# Patient Record
Sex: FEMALE | NOT HISPANIC OR LATINO | Employment: STUDENT | URBAN - METROPOLITAN AREA
[De-identification: names, ages, dates, MRNs, and addresses within clinical notes are randomized per-mention and may not be internally consistent; named-entity substitution may affect disease eponyms.]

---

## 2024-06-25 ENCOUNTER — PRENATAL OFFICE VISIT (OUTPATIENT)
Dept: FAMILY MEDICINE | Facility: CLINIC | Age: 24
End: 2024-06-25
Payer: COMMERCIAL

## 2024-06-25 VITALS
DIASTOLIC BLOOD PRESSURE: 70 MMHG | OXYGEN SATURATION: 98 % | WEIGHT: 114 LBS | TEMPERATURE: 97.6 F | BODY MASS INDEX: 22.98 KG/M2 | HEIGHT: 59 IN | HEART RATE: 83 BPM | SYSTOLIC BLOOD PRESSURE: 90 MMHG

## 2024-06-25 DIAGNOSIS — Z34.81 ENCOUNTER FOR SUPERVISION OF OTHER NORMAL PREGNANCY IN FIRST TRIMESTER: Primary | ICD-10-CM

## 2024-06-25 PROCEDURE — 99213 OFFICE O/P EST LOW 20 MIN: CPT | Performed by: FAMILY MEDICINE

## 2024-06-25 RX ORDER — CHOLECALCIFEROL (VITAMIN D3) 50 MCG
1 TABLET ORAL DAILY
Qty: 90 TABLET | Refills: 1 | Status: SHIPPED | OUTPATIENT
Start: 2024-06-25

## 2024-06-25 NOTE — PATIENT INSTRUCTIONS
Weeks 10 to 14 of Your Pregnancy: Care Instructions  It's now possible to hear the fetus's heartbeat with a special ultrasound device. And the fetus's organs are developing.    Decide about tests to check for birth defects. Think about your age, your chance of passing on a family disease, your need to know about any problems, and what you might do after you have the test results.    It's okay to exercise. Try activities such as walking or swimming. Check with your doctor before starting a new program.    You may feel more tired than usual.  Taking naps during the day may help.     You may feel emotional.  It might help to talk to someone.     You may have headaches.  Try lying down and putting a cool cloth over your forehead.     You can use acetaminophen (Tylenol) for pain relief.  Don't take any anti-inflammatory medicines (such as Advil, Motrin, Aleve), unless your doctor says it's okay.     You may feel a fullness or aching in your lower belly.  This can feel like the kind of cramps you might get before a period. A back rub may help.     You may need to urinate more.  Your growing uterus and changing hormones can affect your bladder.     You may feel sick to your stomach (morning sickness).  Try avoiding food and smells that make you feel sick.     Your breasts may feel different.  They may feel tender or get bigger. Your nipples may get darker. Try a bra that gives you good support.     Avoid alcohol, tobacco, and drugs (including marijuana).  If you need help quitting, talk to your doctor.     Take a daily prenatal vitamin.  Choose one with folic acid.   Follow-up care is a key part of your treatment and safety. Be sure to make and go to all appointments, and call your doctor if you are having problems. It's also a good idea to know your test results and keep a list of the medicines you take.  Where can you learn more?  Go to https://www.healthwise.net/patiented  Enter E090 in the search box to learn more  "about \"Weeks 10 to 14 of Your Pregnancy: Care Instructions.\"  Current as of: July 10, 2023               Content Version: 14.0    2297-6755 [employer].   Care instructions adapted under license by your healthcare professional. If you have questions about a medical condition or this instruction, always ask your healthcare professional. [employer] disclaims any warranty or liability for your use of this information.      Nutrition During Pregnancy: Care Instructions  Overview     Healthy eating when you are pregnant is important for you and your baby. It can help you feel well and have a successful pregnancy and delivery. During pregnancy your nutrition needs increase. Even if you have excellent eating habits, your doctor may recommend a multivitamin to make sure you get enough iron and folic acid.  You may wonder how much weight you should gain. In general, if you were at a healthy weight before you became pregnant, then you should gain between 25 and 35 pounds. If you were overweight before pregnancy, then you'll likely be advised to gain 15 to 25 pounds. If you were underweight before pregnancy, then you'll probably be advised to gain 28 to 40 pounds. Your doctor will work with you to set a weight goal that is right for you. Gaining a healthy amount of weight helps you have a healthy baby.  Follow-up care is a key part of your treatment and safety. Be sure to make and go to all appointments, and call your doctor if you are having problems. It's also a good idea to know your test results and keep a list of the medicines you take.  How can you care for yourself at home?  Eat plenty of fruits and vegetables. Include a variety of orange, yellow, and leafy dark-green vegetables every day.  Choose whole-grain bread, cereal, and pasta. Good choices include whole wheat bread, whole wheat pasta, brown rice, and oatmeal.  Get 4 or more servings of milk and milk products each day. Good choices " include nonfat or low-fat milk, yogurt, and cheese. If you cannot eat milk products, you can get calcium from calcium-fortified products such as orange juice, soy milk, and tofu. Other non-milk sources of calcium include leafy green vegetables, such as broccoli, kale, mustard greens, turnip greens, bok ramu, and brussels sprouts.  If you eat meat, pick lower-fat types. Good choices include lean cuts of meat and chicken or turkey without the skin.  Avoid fish that are high in mercury. These include shark, swordfish, shi mackerel, marlin, orange roughy, and bigeye tuna, as well as tilefish from the [facility].  It's okay to eat up to 8 to 12 ounces a week of fish that are low in mercury or up to 4 ounces a week of fish that have medium levels of mercury. Some fish that are low in mercury are salmon, shrimp, canned light tuna, cod, and tilapia. Some fish that have medium levels of mercury are halibut and white albacore tuna.  For more advice about eating fish, you can visit the U.S. Food and Drug Administration (FDA) or U.S. Environmental Protection Agency (EPA) website.  Heat lunch meats (such as turkey, ham, or bologna) to 165 F before you eat them. This reduces your risk of getting sick from a kind of bacteria that can be found in lunch meats.  Do not eat unpasteurized soft cheeses, such as brie, feta, fresh mozzarella, and blue cheese. They have a bacteria that could harm your baby.  Limit caffeine to about 200 to 300 mg per day. On average, a cup of brewed coffee has around 80 to 100 mg of caffeine.  Do not drink any alcohol. No amount of alcohol has been found to be safe during pregnancy.  Do not diet or try to lose weight. For example, do not follow a low-carbohydrate diet. If you are overweight at the start of your pregnancy, your doctor will work with you to manage your weight gain.  Tell your doctor about all vitamins and supplements you take.  When should you call for help?  Watch closely for changes in  "your health, and be sure to contact your doctor if you have any problems.  Where can you learn more?  Go to https://www."[employer]".net/patiented  Enter Y785 in the search box to learn more about \"Nutrition During Pregnancy: Care Instructions.\"  Current as of: September 20, 2023               Content Version: 14.0    0498-4412 [employer].   Care instructions adapted under license by your healthcare professional. If you have questions about a medical condition or this instruction, always ask your healthcare professional. [employer], [employer] disclaims any warranty or liability for your use of this information.      "

## 2024-06-25 NOTE — PROGRESS NOTES
SUBJECTIVE:      23 year old, female, , 13w2d,  who presents to the clinic today for a new ob visit.    Feels well. Has  started PNV.  Estimated Date of Delivery: Dec 29, 2024   Dec 29, 2024 is calculated from Patient's last menstrual period was 2024..    Cycles are usually 45-60 days   Pregnancy test positive early may   She has not had bleeding since her LMP.   She has had mild nausea. Weight loss has not occurred.   This was a planned pregnancy.   FOB is involved,   Julee       OTHER CONCERNS: none , had healthy normal delivery sep 2022 at [facility] able to review the records     ===========================================  ROS      PSYCHIATRIC:  Denies mood changes, difficulty concentrating, depression, or anxiety         No data to display                 Social Connections: Unknown (2024)    [facility]    Social Connections     Frequency of Communication with Friends and Family: Not on file        History   Drug Use Not on file       History   Smoking Status    Never   Smokeless Tobacco    Never       Social History    Substance and Sexual Activity      Alcohol use: Not on file      History reviewed. No pertinent family history.      MEDICAL HISTORY     No Known Allergies      Current Outpatient Medications:     Omega-3 Fatty Acids (FISH OIL PO), , Disp: , Rfl:     Prenatal Vit-Fe Fumarate-FA (PRENATAL PO), , Disp: , Rfl:     vitamin D3 (CHOLECALCIFEROL) 50 mcg (2000 units) tablet, Take 1 tablet (50 mcg) by mouth daily, Disp: 90 tablet, Rfl: 1    History reviewed. No pertinent past medical history.    History reviewed. No pertinent surgical history.    OB History    Para Term  AB Living   2 1 1 0 0 1   SAB IAB Ectopic Multiple Live Births   0 0 0 0 1      # Outcome Date GA Lbr Karthikeyan/2nd Weight Sex Type Anes PTL Lv   2 Current            1 Term 20 41w0d 15:57 / 00:33 3.118 kg (6 lb 14 oz) F Vag-Spont   "N YAEL      Birth Comments: epidrual, spont labor, breast feeding 8 months, long bith 17 hours Region      Name: Stewart      Apgar1: 9  Apgar5: 9       GYN History-  Abnormal Pap Smears: never done per pt                        Cervical procedures: none                         History of STI: none     I personally reviewed the past social/family/medical and surgical history on the date of service.   I reviewed lab work done at Intake visit with patient.    OBJECTIVE:   PHYSICAL EXAM:  BP 90/70 (BP Location: Left arm, Patient Position: Sitting, Cuff Size: Adult Regular)   Pulse 83   Temp 97.6  F (36.4  C) (Temporal)   Ht 1.499 m (4' 11\")   Wt 51.7 kg (114 lb)   LMP 03/24/2024   SpO2 98%   BMI 23.03 kg/m    BMI- Body mass index is 23.03 kg/m ., GENERAL:  Pleasant pregnant female, alert, cooperative  and well groomed.    PHYSICAL EXAM  General: Alert, no acute distress.   EYES normocephalic conjunctivae are jerrell,   EAR Normal pearly TMs bilaterally without erythema, pus or fluid  Nose is clear.  Oropharynx is moist and clear,   Neck: supple without adenopathy or thyromegaly.  Lungs: Good aeration bilaterally.Clear to auscultation without wheezes, rales or rhonci.    Heart: regular rate and rhythm, normal S1 and S2, no murmurs  Pelvic exam: declined today .   Abdomen: soft and nontender, bowel sounds are present, no hepatosplenomegaly or mass palpable.  Skin: clear without rash or lesions  Neuro: normal muscle tone in all 4 extremities, deep tendon reflexes 2+ symmetrically at the patella     Intrauterine pregnancy 13w2d size  consistent with dates but with longer cycles will order US to confirm dates   POC ultrasound showed normal IUP    Genetic Screening: Declines early screening    Tong was seen today for prenatal care.    Diagnoses and all orders for this visit:    Encounter for supervision of other normal pregnancy in first trimester  -     ABO/Rh type and screen; Future  -     Hepatitis B surface antigen; " Future  -     CBC with platelets; Future  -     HIV Antigen Antibody Combo; Future  -     Rubella Antibody IgG; Future  -     Treponema Abs w Reflex to RPR and Titer; Future  -     Urine Culture Aerobic Bacterial; Future  -     Hemoglobin A1c; Future  -     Hepatitis C antibody; Future  -     TSH with free T4 reflex; Future  -     Myriad Non-Invasive Prenatal Screening-Prequel; Future  -     US OB <14 Weeks w Transvaginal Single; Future  -     vitamin D3 (CHOLECALCIFEROL) 50 mcg (2000 units) tablet; Take 1 tablet (50 mcg) by mouth daily  -     Alpha fetoprotein maternal screen; Future    Other orders  -     PRIMARY CARE FOLLOW-UP SCHEDULING; Future         PLAN:    Orders Placed This Encounter   Procedures    US OB <14 Weeks w Transvaginal Single    Hepatitis B surface antigen    CBC with platelets    HIV Antigen Antibody Combo    Rubella Antibody IgG    Treponema Abs w Reflex to RPR and Titer    Hemoglobin A1c    Hepatitis C antibody    TSH with free T4 reflex    Myriad Non-Invasive Prenatal Screening-Prequel    Alpha fetoprotein maternal screen    Urine Culture Aerobic Bacterial    ABO/Rh type and screen         Discussed:  - Pre-pregnancy  Body mass index is 23.03 kg/m .   Reviewed best evidence for: weight gain for her weight and height for pregnancy:  RECOMMENDED WEIGHT GAIN: 15-25 lbs.   healthy diet and foods to avoid; exercise and activity during pregnancy;avoiding exposure to toxoplasmosis; and maintenance of a generally healthy lifestyle.   - Influenza vaccine  UTD  - COVID vaccinated will do booster in fall   - Genetic screening options, including false positive rate with screening tests and diagnostic options (chorionic villus sampling, amniocentesis),     - Safe medications during pregnancy and prenatal vitamin daily discussed.   - Healthy habits including not using tobacco or alcohol, exercising regularly and maintaining healthy diet  - Information given on tips for dealing with nausea, healthy habits,  exposures, safety, prenatal appointment schedule, and when to call the doctor.  - Recommendations for breastfeeding given.    - Preeclampsia risk factors:  High risk factors (1+): none  Moderate risk factors (2+): none  Based on her risk factors,  Topher Kelly  is NOT at high risk of preeclampsia. Low-dose aspirin prophylaxis is NOT recommended for prevention of preeclampsia.     - Reviewed use of triage nurse line and contacting the on-call provider after hours for an urgent need such as fever, vagina bleeding, bladder or vaginal infection, rupture of membranes,  or term labor.    -   - Discussed the harms, benefits, side effects and alternative therapies for current prescribed and OTC medications.  Orders Placed This Encounter   Medications    Prenatal Vit-Fe Fumarate-FA (PRENATAL PO)    Omega-3 Fatty Acids (FISH OIL PO)    vitamin D3 (CHOLECALCIFEROL) 50 mcg (2000 units) tablet     Sig: Take 1 tablet (50 mcg) by mouth daily     Dispense:  90 tablet     Refill:  1     - All pt's and FOB's  questions discussed and answered.  Pt verbalized understanding of and agreement to plan of care.     - Continue scheduled prenatal care and prn if questions or concerns    Mariia Rivas MD 2024 7:01 AM   [facility].  [phone]

## 2024-07-03 ENCOUNTER — HOSPITAL ENCOUNTER (OUTPATIENT)
Dept: ULTRASOUND IMAGING | Facility: CLINIC | Age: 24
Discharge: HOME | End: 2024-07-03
Attending: FAMILY MEDICINE | Admitting: FAMILY MEDICINE
Payer: COMMERCIAL

## 2024-07-03 DIAGNOSIS — Z34.81 ENCOUNTER FOR SUPERVISION OF OTHER NORMAL PREGNANCY IN FIRST TRIMESTER: ICD-10-CM

## 2024-07-03 PROCEDURE — 76801 OB US < 14 WKS SINGLE FETUS: CPT

## 2024-07-22 LAB
ABO/RH(D): NORMAL
ANTIBODY SCREEN: NEGATIVE
SPECIMEN EXPIRATION DATE: NORMAL

## 2024-07-23 ENCOUNTER — PRENATAL OFFICE VISIT (OUTPATIENT)
Dept: FAMILY MEDICINE | Facility: CLINIC | Age: 24
End: 2024-07-23
Payer: COMMERCIAL

## 2024-07-23 VITALS
TEMPERATURE: 97.4 F | DIASTOLIC BLOOD PRESSURE: 54 MMHG | HEART RATE: 85 BPM | HEIGHT: 59 IN | SYSTOLIC BLOOD PRESSURE: 90 MMHG | BODY MASS INDEX: 23.43 KG/M2 | WEIGHT: 116.2 LBS | OXYGEN SATURATION: 98 %

## 2024-07-23 DIAGNOSIS — Z34.81 ENCOUNTER FOR SUPERVISION OF OTHER NORMAL PREGNANCY IN FIRST TRIMESTER: ICD-10-CM

## 2024-07-23 DIAGNOSIS — Z12.4 SCREENING FOR CERVICAL CANCER: ICD-10-CM

## 2024-07-23 DIAGNOSIS — Z34.82 ENCOUNTER FOR SUPERVISION OF OTHER NORMAL PREGNANCY IN SECOND TRIMESTER: Primary | ICD-10-CM

## 2024-07-23 DIAGNOSIS — N81.11 MIDLINE CYSTOCELE: ICD-10-CM

## 2024-07-23 DIAGNOSIS — R35.0 URINE FREQUENCY: ICD-10-CM

## 2024-07-23 DIAGNOSIS — J31.0 CHRONIC RHINITIS: ICD-10-CM

## 2024-07-23 LAB
ALBUMIN UR-MCNC: ABNORMAL MG/DL
APPEARANCE UR: CLEAR
BILIRUB UR QL STRIP: NEGATIVE
COLOR UR AUTO: YELLOW
ERYTHROCYTE [DISTWIDTH] IN BLOOD BY AUTOMATED COUNT: 12.8 % (ref 10–15)
GLUCOSE UR STRIP-MCNC: NEGATIVE MG/DL
HBA1C MFR BLD: 5.1 % (ref 0–5.6)
HBV SURFACE AG SERPL QL IA: NONREACTIVE
HCT VFR BLD AUTO: 37.1 % (ref 35–47)
HCV AB SERPL QL IA: NONREACTIVE
HGB BLD-MCNC: 12.4 G/DL (ref 11.7–15.7)
HGB UR QL STRIP: NEGATIVE
KETONES UR STRIP-MCNC: NEGATIVE MG/DL
LEUKOCYTE ESTERASE UR QL STRIP: NEGATIVE
MCH RBC QN AUTO: 31.7 PG (ref 26.5–33)
MCHC RBC AUTO-ENTMCNC: 33.4 G/DL (ref 31.5–36.5)
MCV RBC AUTO: 95 FL (ref 78–100)
MUCOUS THREADS #/AREA URNS LPF: PRESENT /LPF
NITRATE UR QL: NEGATIVE
PH UR STRIP: 6 [PH] (ref 5–8)
PLATELET # BLD AUTO: 249 10E3/UL (ref 150–450)
RBC # BLD AUTO: 3.91 10E6/UL (ref 3.8–5.2)
RBC #/AREA URNS AUTO: ABNORMAL /HPF
RUBV IGG SERPL QL IA: 20.7 INDEX
RUBV IGG SERPL QL IA: POSITIVE
SP GR UR STRIP: >=1.03 (ref 1–1.03)
SQUAMOUS #/AREA URNS AUTO: ABNORMAL /LPF
T PALLIDUM AB SER QL: NONREACTIVE
TSH SERPL DL<=0.005 MIU/L-ACNC: 0.99 UIU/ML (ref 0.3–4.2)
UROBILINOGEN UR STRIP-ACNC: 0.2 E.U./DL
WBC # BLD AUTO: 9.4 10E3/UL (ref 4–11)
WBC #/AREA URNS AUTO: ABNORMAL /HPF

## 2024-07-23 PROCEDURE — 82105 ALPHA-FETOPROTEIN SERUM: CPT | Mod: 90 | Performed by: FAMILY MEDICINE

## 2024-07-23 PROCEDURE — 86762 RUBELLA ANTIBODY: CPT | Performed by: FAMILY MEDICINE

## 2024-07-23 PROCEDURE — 84443 ASSAY THYROID STIM HORMONE: CPT | Performed by: FAMILY MEDICINE

## 2024-07-23 PROCEDURE — 99214 OFFICE O/P EST MOD 30 MIN: CPT | Mod: 25 | Performed by: FAMILY MEDICINE

## 2024-07-23 PROCEDURE — 36415 COLL VENOUS BLD VENIPUNCTURE: CPT | Performed by: FAMILY MEDICINE

## 2024-07-23 PROCEDURE — 86803 HEPATITIS C AB TEST: CPT | Performed by: FAMILY MEDICINE

## 2024-07-23 PROCEDURE — 86900 BLOOD TYPING SEROLOGIC ABO: CPT | Performed by: FAMILY MEDICINE

## 2024-07-23 PROCEDURE — 83036 HEMOGLOBIN GLYCOSYLATED A1C: CPT | Performed by: FAMILY MEDICINE

## 2024-07-23 PROCEDURE — 81001 URINALYSIS AUTO W/SCOPE: CPT | Performed by: FAMILY MEDICINE

## 2024-07-23 PROCEDURE — 85027 COMPLETE CBC AUTOMATED: CPT | Performed by: FAMILY MEDICINE

## 2024-07-23 PROCEDURE — 99000 SPECIMEN HANDLING OFFICE-LAB: CPT | Performed by: FAMILY MEDICINE

## 2024-07-23 PROCEDURE — 99207 PR PRENATAL VISIT: CPT | Performed by: FAMILY MEDICINE

## 2024-07-23 PROCEDURE — 86850 RBC ANTIBODY SCREEN: CPT | Performed by: FAMILY MEDICINE

## 2024-07-23 PROCEDURE — 87340 HEPATITIS B SURFACE AG IA: CPT | Performed by: FAMILY MEDICINE

## 2024-07-23 PROCEDURE — 87624 HPV HI-RISK TYP POOLED RSLT: CPT | Performed by: FAMILY MEDICINE

## 2024-07-23 PROCEDURE — 86780 TREPONEMA PALLIDUM: CPT | Performed by: FAMILY MEDICINE

## 2024-07-23 PROCEDURE — 86901 BLOOD TYPING SEROLOGIC RH(D): CPT | Performed by: FAMILY MEDICINE

## 2024-07-23 PROCEDURE — 87086 URINE CULTURE/COLONY COUNT: CPT | Performed by: FAMILY MEDICINE

## 2024-07-23 PROCEDURE — 87389 HIV-1 AG W/HIV-1&-2 AB AG IA: CPT | Performed by: FAMILY MEDICINE

## 2024-07-23 RX ORDER — FLUTICASONE PROPIONATE 50 MCG
1 SPRAY, SUSPENSION (ML) NASAL DAILY
Qty: 16 G | Refills: 0 | Status: SHIPPED | OUTPATIENT
Start: 2024-07-23 | End: 2024-09-25

## 2024-07-23 NOTE — PROGRESS NOTES
Doing well.  Patient concerned about when she sneezes cough or push hard than something, out from her vagina also complaining of some urine frequency .  No history of uterine or cervical prolapse in the last pregnancy not feeling constant pelvic pressure or any abnormal discharge or bleeding    prenatal flowsheet information is reviewed.  Discussed triple/quad screening.  She accepts testing at this time. NIPT and Neural tube defect  screening done today  Reportable signs and symptoms discussed.     Topher was seen today for prenatal care.    Diagnoses and all orders for this visit:    Encounter for supervision of other normal pregnancy in second trimester  -     US OB > 14 Weeks; Future    Chronic rhinitis  Chronic seasonal allergies advised to use Flonase daily with some saline to help prevent the dryness  -     fluticasone (FLONASE) 50 MCG/ACT nasal spray; Spray 1 spray into both nostrils daily    Screening for cervical cancer  -     Primary HPV with Optional Reflex to Pap - Age 30 - 65 Years    Urine frequency  -Follow-up on the urine test treat accordingly       UA Macroscopic with reflex to Microscopic and Culture - Clinic Collect  -     UA Microscopic with Reflex to Culture    Midline cystocele  Able to appreciate cystocele quite significant when she pushing  But cervical exam showing close long  With order the OB ultrasound and also get the cervical length  -     US OB > 14 Weeks; Future    Encounter for supervision of other normal pregnancy in first trimester  -     ABO/Rh type and screen  -     Hepatitis B surface antigen  -     CBC with platelets  -     HIV Antigen Antibody Combo  -     Rubella Antibody IgG  -     Treponema Abs w Reflex to RPR and Titer  -     Urine Culture Aerobic Bacterial  -     Hemoglobin A1c  -     Hepatitis C antibody  -     TSH with free T4 reflex  -     Myriad Non-Invasive Prenatal Screening-Prequel  -     Alpha fetoprotein maternal screen

## 2024-07-24 LAB
BACTERIA UR CULT: NO GROWTH
HIV 1+2 AB+HIV1 P24 AG SERPL QL IA: NONREACTIVE
HPV HR 12 DNA CVX QL NAA+PROBE: NEGATIVE
HPV16 DNA CVX QL NAA+PROBE: NEGATIVE
HPV18 DNA CVX QL NAA+PROBE: NEGATIVE
HUMAN PAPILLOMA VIRUS FINAL DIAGNOSIS: NORMAL

## 2024-07-25 LAB
# FETUSES US: NORMAL
AFP MOM SERPL: 0.62
AFP SERPL-MCNC: 30 NG/ML
AGE - REPORTED: 26.3 YR
CURRENT SMOKER: NO
FAMILY MEMBER DISEASES HX: NO
GA METHOD: NORMAL
GA: NORMAL WK
IDDM PATIENT QL: NO
INTEGRATED SCN PATIENT-IMP: NORMAL
SPECIMEN DRAWN SERPL: NORMAL

## 2024-07-25 PROCEDURE — G0145 SCR C/V CYTO,THINLAYER,RESCR: HCPCS | Performed by: FAMILY MEDICINE

## 2024-07-28 ENCOUNTER — HEALTH MAINTENANCE LETTER (OUTPATIENT)
Age: 24
End: 2024-07-28

## 2024-08-01 LAB — SCANNED LAB RESULT: NORMAL

## 2024-08-05 LAB
BKR LAB AP GYN ADEQUACY: NORMAL
BKR LAB AP GYN INTERPRETATION: NORMAL
BKR LAB AP HPV REFLEX: NO
BKR LAB AP LMP: NORMAL
BKR LAB AP PREVIOUS ABNORMAL: NORMAL
PATH REPORT.COMMENTS IMP SPEC: NORMAL
PATH REPORT.COMMENTS IMP SPEC: NORMAL
PATH REPORT.RELEVANT HX SPEC: NORMAL

## 2024-08-20 ENCOUNTER — HOSPITAL ENCOUNTER (OUTPATIENT)
Dept: ULTRASOUND IMAGING | Facility: CLINIC | Age: 24
Discharge: HOME | End: 2024-08-20
Attending: FAMILY MEDICINE | Admitting: FAMILY MEDICINE
Payer: COMMERCIAL

## 2024-08-20 DIAGNOSIS — N81.11 MIDLINE CYSTOCELE: ICD-10-CM

## 2024-08-20 DIAGNOSIS — Z34.82 ENCOUNTER FOR SUPERVISION OF OTHER NORMAL PREGNANCY IN SECOND TRIMESTER: ICD-10-CM

## 2024-08-20 PROCEDURE — 76805 OB US >/= 14 WKS SNGL FETUS: CPT

## 2024-08-22 ENCOUNTER — PRENATAL OFFICE VISIT (OUTPATIENT)
Dept: FAMILY MEDICINE | Facility: CLINIC | Age: 24
End: 2024-08-22
Payer: COMMERCIAL

## 2024-08-22 VITALS
BODY MASS INDEX: 23.52 KG/M2 | DIASTOLIC BLOOD PRESSURE: 50 MMHG | HEIGHT: 59 IN | HEART RATE: 85 BPM | TEMPERATURE: 97.6 F | WEIGHT: 116.7 LBS | SYSTOLIC BLOOD PRESSURE: 90 MMHG | OXYGEN SATURATION: 97 %

## 2024-08-22 DIAGNOSIS — Z34.82 ENCOUNTER FOR SUPERVISION OF OTHER NORMAL PREGNANCY IN SECOND TRIMESTER: Primary | ICD-10-CM

## 2024-08-22 PROCEDURE — 99207 PR PRENATAL VISIT: CPT | Performed by: FAMILY MEDICINE

## 2024-08-22 NOTE — PATIENT INSTRUCTIONS
"Weeks 22 to 26 of Your Pregnancy: Care Instructions  Your baby's lungs are getting ready for breathing. Your baby may respond to your voice. Your baby likely turns less, and kicks or jerks more. Jerking may mean that your baby has hiccups.    Think about taking childbirth classes. And start to think about whether you want to have pain medicine during labor.    At your next doctor visit, you may be tested for anemia and for high blood sugar that first occurs during pregnancy (gestational diabetes). These conditions can cause problems for you and your baby.    To ease discomfort, such as back pain    Change your position often. Try not to sit or stand for too long.  Get some exercise. Things like walking or stretching may help.  Try using a heating pad or cold pack.    To ease or reduce swelling in your feet, ankles, hands, and fingers    Take off your rings.  Avoid high-sodium foods, such as potato chips.  Prop up your feet, and sleep with pillows under your feet.  Try to avoid standing for long periods of time.  Do not wear tight shoes.  Wear support stockings.  Kegel exercises to prevent urine from leaking    Squeeze your muscles as if you were trying not to pass gas. Your belly, legs, and buttocks shouldn't move. Hold the squeeze for 3 seconds, then relax for 5 to 10 seconds.    Add 1 second each week until you can squeeze for 10 seconds. Repeat the exercise 10 times a session. Do 3 to 8 sessions a day. If these exercises cause you pain, stop doing them and talk with your doctor.  Follow-up care is a key part of your treatment and safety. Be sure to make and go to all appointments, and call your doctor if you are having problems. It's also a good idea to know your test results and keep a list of the medicines you take.  Where can you learn more?  Go to https://www.healthwise.net/patiented  Enter G264 in the search box to learn more about \"Weeks 22 to 26 of Your Pregnancy: Care Instructions.\"  Current as of: July " 10, 2023               Content Version: 14.0    3788-4508 [employer].   Care instructions adapted under license by your healthcare professional. If you have questions about a medical condition or this instruction, always ask your healthcare professional. [employer] disclaims any warranty or liability for your use of this information.       Labor: Care Instructions  Overview      labor is the start of labor between 20 and 36 weeks of pregnancy. Most babies are born at 37 to 42 weeks of pregnancy. In labor, the uterus contracts to open the cervix. This is the first stage of childbirth.  labor can be caused by a problem with the baby, the mother, or both. Often the cause is not known.  In some cases, doctors use medicines to try to delay labor until 34 or more weeks of pregnancy. By this time, a baby has grown enough so that problems are not likely. In some cases--such as with a serious infection--it is healthier for the baby to be born early. Your treatment will depend on how far along you are in your pregnancy and on your health and your baby's health.  Follow-up care is a key part of your treatment and safety. Be sure to make and go to all appointments, and call your doctor if you are having problems. It's also a good idea to know your test results and keep a list of the medicines you take.  How can you care for yourself at home?  If your doctor prescribed medicines, take them exactly as directed. Call your doctor if you think you are having a problem with your medicine.  Rest until your doctor advises you about activity.  Do not have sexual intercourse unless your doctor says it is safe.  Use sanitary pads if you have vaginal bleeding. Using pads makes it easier to monitor your bleeding.  Do not smoke or allow others to smoke around you. If you need help quitting, talk to your doctor about stop-smoking programs and medicines. These can increase your chances of  "quitting for good.  When should you call for help?   Call 911  anytime you think you may need emergency care. For example, call if:    You passed out (lost consciousness).     You have a seizure.     You have severe vaginal bleeding.     You have severe pain in your belly or pelvis that doesn't get better between contractions.     You have had fluid gushing or leaking from your vagina and you know or think the umbilical cord is bulging into your vagina. If this happens, immediately get down on your knees so your rear end (buttocks) is higher than your head. This will decrease the pressure on the cord until help arrives.   Call your doctor now or seek immediate medical care if:    You have signs of preeclampsia, such as:  Sudden swelling of your face, hands, or feet.  New vision problems (such as dimness, blurring, or seeing spots).  A severe headache.     You have any vaginal bleeding.     You have belly pain or cramping.     You have a fever.     You have had regular contractions (with or without pain) for an hour. This means that you have 6 or more within 1 hour after you change your position and drink fluids.     You have a sudden release of fluid from the vagina.     You have low back pain or pelvic pressure that does not go away.     You notice that your baby has stopped moving or is moving much less than normal.   Watch closely for changes in your health, and be sure to contact your doctor if you have any problems.  Where can you learn more?  Go to https://www.[employer].net/patiented  Enter Q400 in the search box to learn more about \" Labor: Care Instructions.\"  Current as of: July 10, 2023               Content Version: 14.0    3255-6367 [employer].   Care instructions adapted under license by your healthcare professional. If you have questions about a medical condition or this instruction, always ask your healthcare professional. [employer] disclaims any warranty or " liability for your use of this information.      Learning About Screening for Gestational Diabetes  What is gestational diabetes screening?     Screening for gestational diabetes is a way to look for high blood sugar during pregnancy. You drink some very sweet liquid. Then you have a blood test to see how your body uses sugar (glucose).  How is gestational diabetes screening done?  Screening for gestational diabetes may be done in a couple of ways.  Two-part screening.  Part one (glucose challenge test): A blood sample is taken after you drink a liquid that contains sugar (glucose). You don't need to stop eating or drinking before this test. If the test shows that you don't have a lot of sugar in your blood, you don't have gestational diabetes.  Part two (oral glucose tolerance test, or OGTT): If the first test shows a lot of sugar in your blood, then you may have an OGTT. You can't eat or drink for at least 8 hours before this test. A blood sample is taken, then you drink a sweet liquid. You have more blood tests after 1 to 3 hours. If the OGTT shows that you have a lot of sugar in your blood, you may have gestational diabetes.  One-part screening.  Sometimes doctors use the OGTT on its own. If the test shows that you don't have a lot of sugar in your blood, you don't have gestational diabetes. If you do have a lot of sugar in your blood, you may have the condition.  What are the risks of screening?  Your blood glucose level may drop very low toward the end of the test. If this happens, you may feel weak, hungry, and restless. Tell your doctor if you have these symptoms. The test usually will be stopped.  You may vomit after drinking the sweet liquid. If this happens, you may need to take the test at a later time.  Your doctor may do more glucose tests at other times during your pregnancy.  Follow-up care is a key part of your treatment and safety. Be sure to make and go to all appointments, and call your doctor if  "you are having problems. It's also a good idea to know your test results and keep a list of the medicines you take.  Where can you learn more?  Go to https://www.[employer].net/patiented  Enter A472 in the search box to learn more about \"Learning About Screening for Gestational Diabetes.\"  Current as of: October 2, 2023               Content Version: 14.0    5695-7118 [employer].   Care instructions adapted under license by your healthcare professional. If you have questions about a medical condition or this instruction, always ask your healthcare professional. [employer] disclaims any warranty or liability for your use of this information.      "

## 2024-08-22 NOTE — PROGRESS NOTES
Doing well.  Patient concerned about when she sneezes cough or push hard than something, out from her vagina also complaining of some urine frequency .  No history of uterine or cervical prolapse in the last pregnancy not feeling constant pelvic pressure or any abnormal discharge or bleeding    prenatal flowsheet information is reviewed.  Discussed triple/quad screening.  She accepts testing at this time. NIPT and Neural tube defect  screening done today  Reportable signs and symptoms discussed.     OB History    Para Term  AB Living   2 1 1 0 0 1   SAB IAB Ectopic Multiple Live Births   0 0 0 0 1      # Outcome Date GA Lbr Karthikeyan/2nd Weight Sex Type Anes PTL Lv   2 Current            1 Term 22 41w0d 15:57 / 00:33 3.118 kg (6 lb 14 oz) F Vag-Spont  N YAEL      Birth Comments: epidrual, spont labor, breast feeding 8 months, long bith 17 hours Region      Name: Stewart      Apgar1: 9  Apgar5: 9      Midline cystocele  Able to appreciate cystocele quite significant when she pushing  But cervical exam showing close long  Topher was seen today for prenatal care.    Topher was seen today for prenatal care.    Diagnoses and all orders for this visit:    Encounter for supervision of other normal pregnancy in second trimester           Plan: RTC 4 wk- 1 hr GTT next visit

## 2024-09-19 ENCOUNTER — OFFICE VISIT (OUTPATIENT)
Dept: FAMILY MEDICINE | Facility: CLINIC | Age: 24
End: 2024-09-19
Payer: COMMERCIAL

## 2024-09-19 VITALS
BODY MASS INDEX: 24.44 KG/M2 | OXYGEN SATURATION: 97 % | WEIGHT: 121 LBS | DIASTOLIC BLOOD PRESSURE: 58 MMHG | SYSTOLIC BLOOD PRESSURE: 106 MMHG | HEART RATE: 90 BPM

## 2024-09-19 DIAGNOSIS — Z34.82 ENCOUNTER FOR SUPERVISION OF OTHER NORMAL PREGNANCY IN SECOND TRIMESTER: Primary | ICD-10-CM

## 2024-09-19 DIAGNOSIS — N81.11 CYSTOCELE, MIDLINE: ICD-10-CM

## 2024-09-19 PROCEDURE — 99207 PR PRENATAL VISIT: CPT | Performed by: FAMILY MEDICINE

## 2024-09-19 NOTE — PROGRESS NOTES
"EL  24w2d  Estimated Date of Delivery: Jan 7, 2025      Chief Complaint   Patient presents with    Prenatal Care     24 weeks       S:   Patient of Dr. Rivas previously but requesting to transfer care, she was seen most recently about 1 month ago on 8/22.  She is now 24 weeks along.  At her last visit she was diagnosed with a cystocele and she was symptomatic when she coughed or sneezed with bulging noted.     She is very worried about the glucose drink test -she is afraid it is going to cause her to have diabetes:  Her ILIANA \"didn't get it tested for the first 2 pregnancies and then did the test for #3 and it caused her to have diabetes\".   We reviewed this in significant detail as that is a test that detects diabetes but does not cause it.  I gave her an alternative option to check blood sugars 4 times a day for 2 weeks  with an A1c versus the 1 hour glucose test and she is going to consider this at her next visit.    Her first pregnancy had spontaneous labor @ 41 weeks (2cm) and had a long 17hr labor due to what sounds like OP positioning. She had a 2nd degree tear.   6lb 14oz      + fetal movement.   No vaginal bleeding, cramping, LOF.   No headaches, vision change, RUQ abdominal pain, LE swelling.       O: Vitals reviewed, see prenatal flowsheet for measurements.   appears well, no acute distress.  Normal respiratory effort.  Abdomen is soft. No LE swelling.     A/P:     Encounter for supervision of other normal pregnancy in second trimester  - Exp baby Girl, NIPS neg, EFW 16%ile @ 20 wks  - Anatomy scan normal  - Next: advised covid/flu/1hr GTT and she defers until 28 week visit      Cystocele, midline  She is interested in physical therapy  - Physical Therapy  Referral; Future      Follow up: 4 weeks     Radha Horowitz MD    "

## 2024-09-19 NOTE — PATIENT INSTRUCTIONS
Call your insurance company to ask how much it might cost to do pelvic floor therapy    Our team will call you to schedule the pelvic floor therapy

## 2024-09-25 DIAGNOSIS — J31.0 CHRONIC RHINITIS: ICD-10-CM

## 2024-09-25 RX ORDER — FLUTICASONE PROPIONATE 50 MCG
1 SPRAY, SUSPENSION (ML) NASAL DAILY
Qty: 16 G | Refills: 0 | Status: SHIPPED | OUTPATIENT
Start: 2024-09-25

## 2024-10-21 ENCOUNTER — OFFICE VISIT (OUTPATIENT)
Dept: FAMILY MEDICINE | Facility: CLINIC | Age: 24
End: 2024-10-21
Payer: COMMERCIAL

## 2024-10-21 VITALS
RESPIRATION RATE: 16 BRPM | WEIGHT: 125.4 LBS | SYSTOLIC BLOOD PRESSURE: 98 MMHG | OXYGEN SATURATION: 98 % | BODY MASS INDEX: 25.28 KG/M2 | DIASTOLIC BLOOD PRESSURE: 68 MMHG | HEIGHT: 59 IN | HEART RATE: 87 BPM

## 2024-10-21 DIAGNOSIS — Z34.83 ENCOUNTER FOR SUPERVISION OF OTHER NORMAL PREGNANCY IN THIRD TRIMESTER: Primary | ICD-10-CM

## 2024-10-21 DIAGNOSIS — N81.11 CYSTOCELE, MIDLINE: ICD-10-CM

## 2024-10-21 LAB
ERYTHROCYTE [DISTWIDTH] IN BLOOD BY AUTOMATED COUNT: 13.1 % (ref 10–15)
GLUCOSE 1H P 50 G GLC PO SERPL-MCNC: 101 MG/DL (ref 70–129)
HCT VFR BLD AUTO: 35.2 % (ref 35–47)
HGB BLD-MCNC: 11.6 G/DL (ref 11.7–15.7)
MCH RBC QN AUTO: 32.4 PG (ref 26.5–33)
MCHC RBC AUTO-ENTMCNC: 33 G/DL (ref 31.5–36.5)
MCV RBC AUTO: 98 FL (ref 78–100)
PLATELET # BLD AUTO: 223 10E3/UL (ref 150–450)
RBC # BLD AUTO: 3.58 10E6/UL (ref 3.8–5.2)
WBC # BLD AUTO: 8.9 10E3/UL (ref 4–11)

## 2024-10-21 PROCEDURE — 99207 PR PRENATAL VISIT: CPT | Performed by: FAMILY MEDICINE

## 2024-10-21 PROCEDURE — 90715 TDAP VACCINE 7 YRS/> IM: CPT | Performed by: FAMILY MEDICINE

## 2024-10-21 PROCEDURE — 36415 COLL VENOUS BLD VENIPUNCTURE: CPT | Performed by: FAMILY MEDICINE

## 2024-10-21 PROCEDURE — 86780 TREPONEMA PALLIDUM: CPT | Performed by: FAMILY MEDICINE

## 2024-10-21 PROCEDURE — 90472 IMMUNIZATION ADMIN EACH ADD: CPT | Performed by: FAMILY MEDICINE

## 2024-10-21 PROCEDURE — 82950 GLUCOSE TEST: CPT | Performed by: FAMILY MEDICINE

## 2024-10-21 PROCEDURE — 90673 RIV3 VACCINE NO PRESERV IM: CPT | Performed by: FAMILY MEDICINE

## 2024-10-21 PROCEDURE — 90471 IMMUNIZATION ADMIN: CPT | Performed by: FAMILY MEDICINE

## 2024-10-21 PROCEDURE — 85027 COMPLETE CBC AUTOMATED: CPT | Performed by: FAMILY MEDICINE

## 2024-10-21 NOTE — PATIENT INSTRUCTIONS
Thank you for discussing your sleep concerns in clinic today. In order to improve your sleep, I recommend:     --No pets in the bedroom  --No caffeine consumption after 4 p.m.  --Keep bedroom cool and conducive to sleep  --No watching the bedroom clock  --No nicotine use, especially in the evening  --No exercising within 2 to 3 hours before bedtime    Avoid excessive stimulation by doing the following:  --Go to bed only when sleepy  --Use the bedroom only for sleep and sex  --Go to another room if unable to fall asleep within 15 to 20 minutes  --Read or engage in other quiet activities and return to bed only when sleepy    It may be helpful to keep a sleep journal and record:  --How long it took to fall asleep the previous night  --Whether you took any sleep aid  --How many times you woke up during the night  --What time you woke up in the morning  --How restful you felt your sleep was the previous night  --When and if you took naps during the day    Calming options:  - Lavender essential oil  - Eye mask   - Weighted blanket  - Mindfulness sleep apps- Calm, Headspace, Smiling Minds for guided sleep meditations

## 2024-10-21 NOTE — PROGRESS NOTES
EL  24w2d  Estimated Date of Delivery: Jan 7, 2025      Chief Complaint   Patient presents with    Prenatal Care     28Wk        S:  She did decide to proceed with glucose test today  Last baby was more active than this one so she wanted to learn more about kick counts which we reviewed this today.     Fatigued  Insomnia starting up  - sleeps at 8pm to 11pm, then wakes for a few hours until 3-4am.   - doesn't want medication  - tried naps which worsens the seep issue  - urinating every 2 hr with pregnancy; no pain/dysuria or hematuria      + fetal movement.   No vaginal bleeding, cramping, LOF.   No headaches, vision change, RUQ abdominal pain, LE swelling.       O: Vitals reviewed, see prenatal flowsheet for measurements.   appears well, no acute distress.  Normal respiratory effort.  Abdomen is soft. No LE swelling.     A/P:     Encounter for supervision of other normal pregnancy in second trimester  Transfer of care from colleague @ 19 weeks gestation  US 7/3/24-13 weeks 1, EDC 1/7/2025. (This was 1 week 2 days difference from LMP hence using US for dating)  - Exp baby Girl, NIPS neg, EFW 16%ile @ 20 wks  - Anatomy scan normal  - Flu & tdap given 10/21/2024   - declines COVID  - 1hr GTT today, with CBC/syphilis  - Next: review RSV  - Birth plan: Her first pregnancy had spontaneous labor @ 41 weeks (2cm) and had a long 17hr labor due to what sounds like OP positioning. She had a 2nd degree tear.   6lb 14oz    Cystocele, midline  Referral for pelvic floor physical therapy placed previously      Follow up: 2 weeks     Radha Horowitz MD

## 2024-10-22 LAB — T PALLIDUM AB SER QL: NONREACTIVE

## 2024-11-04 ENCOUNTER — PRENATAL OFFICE VISIT (OUTPATIENT)
Dept: FAMILY MEDICINE | Facility: CLINIC | Age: 24
End: 2024-11-04
Payer: COMMERCIAL

## 2024-11-04 VITALS
HEART RATE: 92 BPM | DIASTOLIC BLOOD PRESSURE: 64 MMHG | HEIGHT: 59 IN | BODY MASS INDEX: 25.7 KG/M2 | SYSTOLIC BLOOD PRESSURE: 94 MMHG | WEIGHT: 127.5 LBS | OXYGEN SATURATION: 97 % | RESPIRATION RATE: 16 BRPM

## 2024-11-04 DIAGNOSIS — N81.11 MIDLINE CYSTOCELE: ICD-10-CM

## 2024-11-04 DIAGNOSIS — Z34.83 ENCOUNTER FOR SUPERVISION OF OTHER NORMAL PREGNANCY IN THIRD TRIMESTER: Primary | ICD-10-CM

## 2024-11-04 PROCEDURE — 99207 PR PRENATAL VISIT: CPT | Performed by: FAMILY MEDICINE

## 2024-11-04 NOTE — PROGRESS NOTES
EL  30w6d  Estimated Date of Delivery: Jan 7, 2025      Chief Complaint   Patient presents with    Prenatal Care     30WK OB- Wanting to know baby's position      S:  Mild back pain- reviewed cat/cow yoga moves and option for belly band. Has a known cystocele but declined pelvic PT until readdressed today    Some insomnia still persists; started listening to calm music which helped    Hb 11.6 @ 28 week visit; adding more iron in her diet since my result message    + fetal movement.   No vaginal bleeding, cramping, LOF.   No headaches, vision change, RUQ abdominal pain, LE swelling.       O: Vitals reviewed, see prenatal flowsheet for measurements.   appears well, no acute distress.  Normal respiratory effort.  Abdomen is soft. No LE swelling.   Cephalic by bedside ultrasound    A/P:     Encounter for supervision of other normal pregnancy in second trimester  Transfer of care from colleague @ 19 weeks gestation  US 7/3/24-13 weeks 1, EDC 1/7/2025. (This was 1 week 2 days difference from LMP hence using US for dating)  - Exp baby Girl, NIPS neg, EFW 16%ile @ 20 wks  - Anatomy scan normal  - Flu & tdap given 10/21/2024   - declines COVID  - 1hr GTT done @ 25 wk visit, with CBC/syphilis  - Next: reviewed RSV vaccine avail @ 32-36 weeks gestation  - Birth plan: Her first pregnancy had spontaneous labor @ 41 weeks (2cm) and had a long 17hr labor due to what sounds like OP positioning. She had a 2nd degree tear.   6lb 14oz    Cystocele, midline  Referral for pelvic floor physical therapy placed previously  - encouraged to schedule since she is having some low back pain now (can be common at this gestational age; reviewed options such as cat cow yoga moves, belly band, heat/ice/stretching.)      Follow up: 2 weeks     Radha Horowitz MD

## 2024-11-14 ENCOUNTER — OFFICE VISIT (OUTPATIENT)
Dept: FAMILY MEDICINE | Facility: CLINIC | Age: 24
End: 2024-11-14
Payer: COMMERCIAL

## 2024-11-14 VITALS
WEIGHT: 127.6 LBS | HEIGHT: 59 IN | RESPIRATION RATE: 20 BRPM | HEART RATE: 101 BPM | SYSTOLIC BLOOD PRESSURE: 97 MMHG | OXYGEN SATURATION: 97 % | TEMPERATURE: 97.9 F | DIASTOLIC BLOOD PRESSURE: 63 MMHG | BODY MASS INDEX: 25.72 KG/M2

## 2024-11-14 DIAGNOSIS — N81.11 MIDLINE CYSTOCELE: ICD-10-CM

## 2024-11-14 DIAGNOSIS — Z34.83 ENCOUNTER FOR SUPERVISION OF OTHER NORMAL PREGNANCY IN THIRD TRIMESTER: Primary | ICD-10-CM

## 2024-11-14 DIAGNOSIS — N81.11 CYSTOCELE, MIDLINE: ICD-10-CM

## 2024-11-14 NOTE — PROGRESS NOTES
EL  32w2d    Estimated Date of Delivery: Jan 7, 2025      Chief Complaint   Patient presents with    Prenatal Care     Return OB 32 weeks     S: Back pain improved with use of a belly band and the cat cow yoga moves.  Uses those just as needed and not on a regular open though.    Some insomnia still persists; started listening to calm music which helped- declines need for medication  Very tired    Hb 11.6 @ 28 week visit; adding more iron in her diet    Has a Spectra pump from last pregnancy; nursed 8 months  Would like new pump    + fetal movement.   No vaginal bleeding, cramping, LOF.   No headaches, vision change, RUQ abdominal pain, LE swelling.       O: Vitals reviewed, see prenatal flowsheet for measurements.   appears well, no acute distress.  Normal respiratory effort.  Abdomen is soft. No LE swelling.   Cephalic by bedside ultrasound    A/P:     Encounter for supervision of other normal pregnancy in 3rd trimester  Transfer of care from colleague @ 19 weeks gestation  US 7/3/24-13 weeks 1, EDC 1/7/2025. (This was 1 week 2 days difference from LMP hence using US for dating)  - Exp baby Girl, NIPS neg, EFW 16%ile @ 20 wks  - Anatomy scan normal  - Flu & tdap given 10/21/2024   - declines COVID  - 1hr GTT done @ 25 wk visit, with CBC/syphilis  - Next: reviewed RSV vaccine avail @ 32-36 weeks gestation- she would like to wait to closer to 35 weeks  - Birth plan: Her first pregnancy had spontaneous labor @ 41 weeks (2cm) and had a long 17hr labor due to what sounds like OP positioning. She had a 2nd degree tear.   6lb 14oz    Cystocele, midline  Referral for pelvic floor physical therapy placed previously  - encouraged to schedule since she is having some low back pain now (can be common at this gestational age; reviewed options such as cat cow yoga moves, belly band, heat/ice/stretching.)      Follow up: 2 weeks     Radha Horowitz MD

## 2024-11-14 NOTE — PATIENT INSTRUCTIONS
"Breast Pumps:  [email or web address]  Click on the pink button at top right of the webpage for the \"fast track order form\" and use the breast pump prescription that we provided today for this process.    \"Rosemarie Alarcon is an accredited and authorized DME provider specializing in breast pumps and insurance billing. Most insurance plans cover a breast pump following the Affordable Care Act guidelines. We do all the hard work of contacting your insurance to verify your coverage and eligibility. We ship, free of charge, nation wide via UPS (1-5 business days) for all pump orders!\"        "

## 2024-11-25 ENCOUNTER — PRENATAL OFFICE VISIT (OUTPATIENT)
Dept: FAMILY MEDICINE | Facility: CLINIC | Age: 24
End: 2024-11-25
Payer: COMMERCIAL

## 2024-11-25 VITALS
OXYGEN SATURATION: 98 % | SYSTOLIC BLOOD PRESSURE: 97 MMHG | TEMPERATURE: 97.7 F | BODY MASS INDEX: 26.31 KG/M2 | HEART RATE: 86 BPM | WEIGHT: 130.5 LBS | RESPIRATION RATE: 16 BRPM | HEIGHT: 59 IN | DIASTOLIC BLOOD PRESSURE: 66 MMHG

## 2024-11-25 DIAGNOSIS — Z34.83 ENCOUNTER FOR SUPERVISION OF OTHER NORMAL PREGNANCY IN THIRD TRIMESTER: Primary | ICD-10-CM

## 2024-11-25 PROCEDURE — 90678 RSV VACC PREF BIVALENT IM: CPT | Performed by: FAMILY MEDICINE

## 2024-11-25 PROCEDURE — 99207 PR PRENATAL VISIT: CPT | Performed by: FAMILY MEDICINE

## 2024-11-25 PROCEDURE — 90471 IMMUNIZATION ADMIN: CPT | Performed by: FAMILY MEDICINE

## 2024-11-25 NOTE — PROGRESS NOTES
EL  33w6d    Estimated Date of Delivery: Jan 7, 2025      Chief Complaint   Patient presents with    Prenatal Care     34WK OB     S:   Feeling well ; reviewed some birth plan questions- open to epidural  Getting a bit more anxious   + fetal movement.   No vaginal bleeding, cramping, LOF.   No headaches, vision change, RUQ abdominal pain, LE swelling.       O: Vitals reviewed, see prenatal flowsheet for measurements.   appears well, no acute distress.  Normal respiratory effort.  Abdomen is soft. No LE swelling.       A/P:     Encounter for supervision of other normal pregnancy in 3rd trimester  Transfer of care from colleague @ 19 weeks gestation  US 7/3/24-13 weeks 1, EDC 1/7/2025. (This was 1 week 2 days difference from LMP hence using US for dating)  - Exp baby Girl, NIPS neg, EFW 16%ile @ 20 wks  - Anatomy scan normal  - Flu & tdap given 10/21/2024   - declines COVID  - 1hr GTT done @ 25 wk visit, with CBC/syphilis  - Next:RSV vaccine today 11/25/2024   - Birth plan: Her first pregnancy had spontaneous labor @ 41 weeks (2cm) and had a long 17hr labor due to what sounds like OP positioning. She had a 2nd degree tear. Had epidural  6lb 14oz    Cystocele, midline  Referral for pelvic floor physical therapy placed previously  - encouraged to schedule since she is having some low back pain now (can be common at this gestational age; reviewed options such as cat cow yoga moves, belly band, heat/ice/stretching.)      Follow up: 2 weeks     Radha Horowitz MD

## 2024-11-25 NOTE — PATIENT INSTRUCTIONS
Childbirth Classes     Free classes:  [employer] website has 9 free, short videos that are helpful for basic overview  [email or web address]    [facility] offers a great selection of $10 classes for Labor Preparation and Infant Safety. You do need to register for these online self paced classes but they are offered at no cost to you.  [email or web address]  Specifically for the Labor Prep you can follow 4-5 women in their journey through unmedicated, medicated,  and  deliveries.        [facility]  [email or web address]     Typically new parents consider the four main classes, but there are many others to choose from on the website:  Preparing for Childbirth $120  Breastfeeding $40  Newborns 101 $40  Infant CPR  $70      [employer]                                                [email or web address]  [phone]  [employer] providing pregnancy, labor and parenting support  Affordable birth classes, potentially covered by insurance

## 2024-12-12 ENCOUNTER — PRENATAL OFFICE VISIT (OUTPATIENT)
Dept: FAMILY MEDICINE | Facility: CLINIC | Age: 24
End: 2024-12-12
Payer: COMMERCIAL

## 2024-12-12 VITALS
SYSTOLIC BLOOD PRESSURE: 95 MMHG | OXYGEN SATURATION: 98 % | RESPIRATION RATE: 18 BRPM | HEART RATE: 88 BPM | WEIGHT: 131.31 LBS | DIASTOLIC BLOOD PRESSURE: 60 MMHG | BODY MASS INDEX: 26.47 KG/M2 | HEIGHT: 59 IN

## 2024-12-12 DIAGNOSIS — Z34.83 ENCOUNTER FOR SUPERVISION OF OTHER NORMAL PREGNANCY IN THIRD TRIMESTER: Primary | ICD-10-CM

## 2024-12-12 DIAGNOSIS — N81.11 MIDLINE CYSTOCELE: ICD-10-CM

## 2024-12-12 NOTE — PROGRESS NOTES
EL  36w2d      Estimated Date of Delivery: Jan 7, 2025    Chief Complaint   Patient presents with    Prenatal Care     36 weeks       S:   Feeling well   Has breast pump    Getting a bit more anxious     + fetal movement.   No vaginal bleeding, cramping, LOF.   No headaches, vision change, RUQ abdominal pain, LE swelling.       O: Vitals reviewed, see prenatal flowsheet for measurements.   appears well, no acute distress.  Normal respiratory effort.  Abdomen is soft. No LE swelling.   Cephalic by leopolds today    A/P:     Encounter for supervision of other normal pregnancy in 3rd trimester  Transfer of care from colleague @ 19 weeks gestation  US 7/3/24-13 weeks 1, EDC 1/7/2025. (This was 1 week 2 days difference from LMP hence using US for dating)  - Exp baby Girl, NIPS neg, EFW 16%ile @ 20 wks  - Anatomy scan normal  - Flu & tdap given 10/21/2024   - declines COVID  - 1hr GTT done @ 25 wk visit, with CBC/syphilis  - RSV vaccine 11/25/2024   - reviewed [facility] number to call if 511 of labor or if water breaks  - GBS collected 12/12/2024   - Birth plan: Her first pregnancy had spontaneous labor @ 41 weeks (2cm) and had a long 17hr labor due to what sounds like OP positioning. She had a 2nd degree tear. Had epidural. Daughter was 6lb 14oz    Cystocele, midline  Referral for pelvic floor physical therapy placed previously  - encouraged to schedule since she is having some low back pain (can be common at this gestational age; reviewed options such as cat cow yoga moves, belly band, heat/ice/stretching.)      Follow up: weekly until delivery    Radha Horowitz MD

## 2024-12-12 NOTE — PATIENT INSTRUCTIONS
Please call [facility] Labor & Delivery at [phone] to speak to a nurse if you think you may be in labor or if your water ruptures or if you have any other urgent questions at any time of day or night.     Check out the main website for a VIRTUAL TOUR!!  Please note during COVID certain modalities may or may not be available (such as water birth, though tubs for laboring are available).  [email or web address]

## 2024-12-26 ENCOUNTER — PRENATAL OFFICE VISIT (OUTPATIENT)
Dept: FAMILY MEDICINE | Facility: CLINIC | Age: 24
End: 2024-12-26
Payer: COMMERCIAL

## 2024-12-26 VITALS
OXYGEN SATURATION: 97 % | RESPIRATION RATE: 16 BRPM | WEIGHT: 137.9 LBS | HEIGHT: 59 IN | HEART RATE: 83 BPM | BODY MASS INDEX: 27.8 KG/M2 | SYSTOLIC BLOOD PRESSURE: 100 MMHG | DIASTOLIC BLOOD PRESSURE: 65 MMHG | TEMPERATURE: 97.6 F

## 2024-12-26 DIAGNOSIS — Z34.83 ENCOUNTER FOR SUPERVISION OF OTHER NORMAL PREGNANCY IN THIRD TRIMESTER: Primary | ICD-10-CM

## 2024-12-26 NOTE — PROGRESS NOTES
EL  38w2d    Estimated Date of Delivery: Jan 7, 2025    Chief Complaint   Patient presents with    Prenatal Care     38WK OB       S:   Feeling well - missed last week's appt due to snowstorm    Reviewed I will be traveling for holidays periodically next week; back up FMOB coverage will be discussed    Starting to notice some tightenings c/w kevin sharon    + fetal movement.   No vaginal bleeding, cramping, LOF.   No headaches, vision change, RUQ abdominal pain, LE swelling.       O: Vitals reviewed, see prenatal flowsheet for measurements.   appears well, no acute distress.  Normal respiratory effort.  Abdomen is soft. No LE swelling.   Cephalic by leopolds today and confirmed vertex by bedside US  Declined cervical exam    A/P:     Encounter for supervision of other normal pregnancy in 3rd trimester  Transfer of care from colleague @ 24 weeks gestation  US 7/3/24-13 weeks 1, EDC 1/7/2025. (This was 1 week 2 days difference from LMP hence using US for dating)  - Exp baby Girl, NIPS neg, EFW 16%ile @ 20 wks  - Anatomy scan normal  - Flu & tdap given 10/21/2024   - declines COVID  - 1hr GTT done @ 25 wk visit, with CBC/syphilis  - RSV vaccine 11/25/2024   - reviewed WW number to call if 511 of labor or if water breaks  - GBS + on 12/12/2024   - Birth plan: Her first pregnancy had spontaneous labor @ 41 weeks (2cm) and had a long 17hr labor due to what sounds like OP positioning. She had a 2nd degree tear. Had epidural. Daughter was 6lb 14oz    Cystocele, midline  Referral for pelvic floor physical therapy placed previously  - encouraged to schedule since she is having some low back pain (can be common at this gestational age; reviewed options such as cat cow yoga moves, belly band, heat/ice/stretching.)      Follow up: weekly until delivery    Radha Horowitz MD

## 2025-01-02 ENCOUNTER — PRENATAL OFFICE VISIT (OUTPATIENT)
Dept: FAMILY MEDICINE | Facility: CLINIC | Age: 25
End: 2025-01-02
Payer: COMMERCIAL

## 2025-01-02 VITALS
RESPIRATION RATE: 18 BRPM | HEART RATE: 76 BPM | DIASTOLIC BLOOD PRESSURE: 72 MMHG | HEIGHT: 59 IN | TEMPERATURE: 97.8 F | OXYGEN SATURATION: 99 % | BODY MASS INDEX: 27.42 KG/M2 | WEIGHT: 136 LBS | SYSTOLIC BLOOD PRESSURE: 106 MMHG

## 2025-01-02 DIAGNOSIS — N81.11 MIDLINE CYSTOCELE: ICD-10-CM

## 2025-01-02 DIAGNOSIS — Z34.83 ENCOUNTER FOR SUPERVISION OF OTHER NORMAL PREGNANCY IN THIRD TRIMESTER: Primary | ICD-10-CM

## 2025-01-02 ASSESSMENT — PAIN SCALES - GENERAL: PAINLEVEL_OUTOF10: NO PAIN (0)

## 2025-01-02 NOTE — PROGRESS NOTES
EL  39w2d    Estimated Date of Delivery: Jan 7, 2025    Chief Complaint   Patient presents with    Follow Up     Patient is here for a 39 week OB.        S:   More frequent Kenedy sharon but not lasting >1hr at a time ever.     Reviewed risks/benefits/options for 39-40 wk IOL and pt declines at this time as her body is making good change and she was open to membrane stripping today and hopeful to continue progressing.     + fetal movement.   No vaginal bleeding, cramping, LOF.   No headaches, vision change, RUQ abdominal pain, LE swelling.       O: Vitals reviewed, see prenatal flowsheet for measurements.   appears well, no acute distress.  Normal respiratory effort.  Abdomen is soft. No LE swelling.   Cephalic by leopolds today and confirmed vertex by bedside US  Cervix: 2.5cm/60%/-2/posterior, soft   Yates score of 6.  Pt agreeable to membrane sweep which was performed without complication- scant blood on gloved finger at the conclusion. Membranes remained in tact.       A/P:     Encounter for supervision of other normal pregnancy in 3rd trimester  Transfer of care from colleague @ 24 weeks gestation  US 7/3/24-13 weeks 1, EDC 1/7/2025. (This was 1 week 2 days difference from LMP hence using US for dating)  - Exp baby Girl, NIPS neg, EFW 16%ile @ 20 wks  - Anatomy scan normal  - Flu & tdap given 10/21/2024   - declines COVID  - 1hr GTT done @ 25 wk visit, with CBC/syphilis  - RSV vaccine 11/25/2024   - reviewed WW number to call if 511 of labor or if water breaks  - GBS + on 12/12/2024   - Birth plan: Her first pregnancy had spontaneous labor @ 41 weeks (2cm on admission) and had a long 17hr labor due to what sounds like OP positioning. She had a 2nd degree tear. Had epidural. Daughter was 6lb 14oz    Cystocele, midline  Referral for pelvic floor physical therapy placed previously  - encouraged to schedule since she is having some low back pain (can be common at this gestational age; reviewed options such as cat  cow yoga moves, belly band, heat/ice/stretching.)      Follow up: weekly until delivery- at her 1/9 appt with me she will be scheduled for IOL @ 41 weeks if not yet delivered. This will take place while I am out of state for travel/CME trip and thus will connect with the FMOB on call to sign out if needed for optimal timing.       Radha Horowitz MD

## 2025-01-02 NOTE — PATIENT INSTRUCTIONS
Please call [facility] Labor & Delivery at [phone] to speak to a nurse if you think you may be in labor or if your water ruptures or if you have any other urgent questions at any time of day or night.     Check out the main website for a VIRTUAL TOUR!!  Please note during COVID certain modalities may or may not be available (such as water birth, though tubs for laboring are available).  [email or web address]

## 2025-01-05 ENCOUNTER — ANESTHESIA (OUTPATIENT)
Dept: OBGYN | Facility: CLINIC | Age: 25
End: 2025-01-05
Payer: COMMERCIAL

## 2025-01-05 ENCOUNTER — HOSPITAL ENCOUNTER (INPATIENT)
Facility: CLINIC | Age: 25
LOS: 2 days | Discharge: HOME HEALTH | End: 2025-01-07
Attending: FAMILY MEDICINE | Admitting: FAMILY MEDICINE
Payer: COMMERCIAL

## 2025-01-05 ENCOUNTER — ANESTHESIA EVENT (OUTPATIENT)
Dept: OBGYN | Facility: CLINIC | Age: 25
End: 2025-01-05
Payer: COMMERCIAL

## 2025-01-05 ENCOUNTER — TELEPHONE (OUTPATIENT)
Dept: OBGYN | Facility: CLINIC | Age: 25
End: 2025-01-05
Payer: COMMERCIAL

## 2025-01-05 PROBLEM — Z36.89 ENCOUNTER FOR TRIAGE IN PREGNANT PATIENT: Status: ACTIVE | Noted: 2025-01-05

## 2025-01-05 PROBLEM — Z37.9 NORMAL LABOR: Status: ACTIVE | Noted: 2025-01-05

## 2025-01-05 LAB
ABO + RH BLD: NORMAL
BLD GP AB SCN SERPL QL: NEGATIVE
HGB BLD-MCNC: 14.6 G/DL (ref 11.7–15.7)
SPECIMEN EXP DATE BLD: NORMAL
T PALLIDUM AB SER QL: NONREACTIVE

## 2025-01-05 PROCEDURE — 258N000003 HC RX IP 258 OP 636: Performed by: FAMILY MEDICINE

## 2025-01-05 PROCEDURE — 250N000011 HC RX IP 250 OP 636: Performed by: FAMILY MEDICINE

## 2025-01-05 PROCEDURE — 250N000011 HC RX IP 250 OP 636: Performed by: ANESTHESIOLOGY

## 2025-01-05 PROCEDURE — 59400 OBSTETRICAL CARE: CPT | Mod: GC | Performed by: FAMILY MEDICINE

## 2025-01-05 PROCEDURE — 250N000009 HC RX 250: Performed by: FAMILY MEDICINE

## 2025-01-05 PROCEDURE — 250N000013 HC RX MED GY IP 250 OP 250 PS 637: Performed by: FAMILY MEDICINE

## 2025-01-05 PROCEDURE — 722N000001 HC LABOR CARE VAGINAL DELIVERY SINGLE

## 2025-01-05 PROCEDURE — 3E0R3BZ INTRODUCTION OF ANESTHETIC AGENT INTO SPINAL CANAL, PERCUTANEOUS APPROACH: ICD-10-PCS | Performed by: ANESTHESIOLOGY

## 2025-01-05 PROCEDURE — 370N000003 HC ANESTHESIA WARD SERVICE: Performed by: ANESTHESIOLOGY

## 2025-01-05 PROCEDURE — 0KQM0ZZ REPAIR PERINEUM MUSCLE, OPEN APPROACH: ICD-10-PCS | Performed by: FAMILY MEDICINE

## 2025-01-05 PROCEDURE — 86780 TREPONEMA PALLIDUM: CPT | Performed by: FAMILY MEDICINE

## 2025-01-05 PROCEDURE — 85018 HEMOGLOBIN: CPT | Performed by: FAMILY MEDICINE

## 2025-01-05 PROCEDURE — 00HU33Z INSERTION OF INFUSION DEVICE INTO SPINAL CANAL, PERCUTANEOUS APPROACH: ICD-10-PCS | Performed by: ANESTHESIOLOGY

## 2025-01-05 PROCEDURE — 120N000001 HC R&B MED SURG/OB

## 2025-01-05 PROCEDURE — 86900 BLOOD TYPING SEROLOGIC ABO: CPT | Performed by: FAMILY MEDICINE

## 2025-01-05 PROCEDURE — 86850 RBC ANTIBODY SCREEN: CPT | Performed by: FAMILY MEDICINE

## 2025-01-05 RX ORDER — OXYTOCIN/0.9 % SODIUM CHLORIDE 30/500 ML
340 PLASTIC BAG, INJECTION (ML) INTRAVENOUS CONTINUOUS PRN
Status: DISCONTINUED | OUTPATIENT
Start: 2025-01-05 | End: 2025-01-06 | Stop reason: HOSPADM

## 2025-01-05 RX ORDER — OXYTOCIN 10 [USP'U]/ML
10 INJECTION, SOLUTION INTRAMUSCULAR; INTRAVENOUS
Status: DISCONTINUED | OUTPATIENT
Start: 2025-01-05 | End: 2025-01-07 | Stop reason: HOSPADM

## 2025-01-05 RX ORDER — ONDANSETRON 2 MG/ML
4 INJECTION INTRAMUSCULAR; INTRAVENOUS EVERY 6 HOURS PRN
Status: DISCONTINUED | OUTPATIENT
Start: 2025-01-05 | End: 2025-01-05

## 2025-01-05 RX ORDER — PROCHLORPERAZINE MALEATE 10 MG
10 TABLET ORAL EVERY 6 HOURS PRN
Status: DISCONTINUED | OUTPATIENT
Start: 2025-01-05 | End: 2025-01-06 | Stop reason: HOSPADM

## 2025-01-05 RX ORDER — KETOROLAC TROMETHAMINE 30 MG/ML
30 INJECTION, SOLUTION INTRAMUSCULAR; INTRAVENOUS
Status: DISCONTINUED | OUTPATIENT
Start: 2025-01-05 | End: 2025-01-05

## 2025-01-05 RX ORDER — ONDANSETRON 2 MG/ML
4 INJECTION INTRAMUSCULAR; INTRAVENOUS EVERY 6 HOURS PRN
Status: DISCONTINUED | OUTPATIENT
Start: 2025-01-05 | End: 2025-01-06 | Stop reason: HOSPADM

## 2025-01-05 RX ORDER — LOPERAMIDE HYDROCHLORIDE 2 MG/1
4 CAPSULE ORAL
Status: DISCONTINUED | OUTPATIENT
Start: 2025-01-05 | End: 2025-01-05

## 2025-01-05 RX ORDER — NALOXONE HYDROCHLORIDE 0.4 MG/ML
0.2 INJECTION, SOLUTION INTRAMUSCULAR; INTRAVENOUS; SUBCUTANEOUS
Status: DISCONTINUED | OUTPATIENT
Start: 2025-01-05 | End: 2025-01-05

## 2025-01-05 RX ORDER — CARBOPROST TROMETHAMINE 250 UG/ML
250 INJECTION, SOLUTION INTRAMUSCULAR
Status: DISCONTINUED | OUTPATIENT
Start: 2025-01-05 | End: 2025-01-06 | Stop reason: HOSPADM

## 2025-01-05 RX ORDER — PROCHLORPERAZINE MALEATE 10 MG
10 TABLET ORAL EVERY 6 HOURS PRN
Status: DISCONTINUED | OUTPATIENT
Start: 2025-01-05 | End: 2025-01-05

## 2025-01-05 RX ORDER — PENICILLIN G 3000000 [IU]/50ML
3 INJECTION, SOLUTION INTRAVENOUS EVERY 4 HOURS
Status: DISCONTINUED | OUTPATIENT
Start: 2025-01-05 | End: 2025-01-05

## 2025-01-05 RX ORDER — METOCLOPRAMIDE 10 MG/1
10 TABLET ORAL EVERY 6 HOURS PRN
Status: DISCONTINUED | OUTPATIENT
Start: 2025-01-05 | End: 2025-01-06 | Stop reason: HOSPADM

## 2025-01-05 RX ORDER — PENICILLIN G POTASSIUM 5000000 [IU]/1
5 INJECTION, POWDER, FOR SOLUTION INTRAMUSCULAR; INTRAVENOUS ONCE
Status: DISCONTINUED | OUTPATIENT
Start: 2025-01-05 | End: 2025-01-05

## 2025-01-05 RX ORDER — CITRIC ACID/SODIUM CITRATE 334-500MG
30 SOLUTION, ORAL ORAL
Status: DISCONTINUED | OUTPATIENT
Start: 2025-01-05 | End: 2025-01-06 | Stop reason: HOSPADM

## 2025-01-05 RX ORDER — METHYLERGONOVINE MALEATE 0.2 MG/ML
200 INJECTION INTRAVENOUS
Status: DISCONTINUED | OUTPATIENT
Start: 2025-01-05 | End: 2025-01-05

## 2025-01-05 RX ORDER — LIDOCAINE 40 MG/G
CREAM TOPICAL
Status: DISCONTINUED | OUTPATIENT
Start: 2025-01-05 | End: 2025-01-05 | Stop reason: HOSPADM

## 2025-01-05 RX ORDER — LOPERAMIDE HYDROCHLORIDE 2 MG/1
4 CAPSULE ORAL
Status: DISCONTINUED | OUTPATIENT
Start: 2025-01-05 | End: 2025-01-06 | Stop reason: HOSPADM

## 2025-01-05 RX ORDER — ONDANSETRON 4 MG/1
4 TABLET, ORALLY DISINTEGRATING ORAL EVERY 6 HOURS PRN
Status: DISCONTINUED | OUTPATIENT
Start: 2025-01-05 | End: 2025-01-05

## 2025-01-05 RX ORDER — METOCLOPRAMIDE HYDROCHLORIDE 5 MG/ML
10 INJECTION INTRAMUSCULAR; INTRAVENOUS EVERY 6 HOURS PRN
Status: DISCONTINUED | OUTPATIENT
Start: 2025-01-05 | End: 2025-01-05

## 2025-01-05 RX ORDER — METHYLERGONOVINE MALEATE 0.2 MG/ML
200 INJECTION INTRAVENOUS
Status: DISCONTINUED | OUTPATIENT
Start: 2025-01-05 | End: 2025-01-06 | Stop reason: HOSPADM

## 2025-01-05 RX ORDER — KETOROLAC TROMETHAMINE 30 MG/ML
30 INJECTION, SOLUTION INTRAMUSCULAR; INTRAVENOUS
Status: COMPLETED | OUTPATIENT
Start: 2025-01-05 | End: 2025-01-05

## 2025-01-05 RX ORDER — NALOXONE HYDROCHLORIDE 0.4 MG/ML
0.4 INJECTION, SOLUTION INTRAMUSCULAR; INTRAVENOUS; SUBCUTANEOUS
Status: DISCONTINUED | OUTPATIENT
Start: 2025-01-05 | End: 2025-01-05

## 2025-01-05 RX ORDER — CARBOPROST TROMETHAMINE 250 UG/ML
250 INJECTION, SOLUTION INTRAMUSCULAR
Status: DISCONTINUED | OUTPATIENT
Start: 2025-01-05 | End: 2025-01-05

## 2025-01-05 RX ORDER — IBUPROFEN 800 MG/1
800 TABLET, FILM COATED ORAL
Status: DISCONTINUED | OUTPATIENT
Start: 2025-01-05 | End: 2025-01-05

## 2025-01-05 RX ORDER — OXYTOCIN 10 [USP'U]/ML
10 INJECTION, SOLUTION INTRAMUSCULAR; INTRAVENOUS
Status: DISCONTINUED | OUTPATIENT
Start: 2025-01-05 | End: 2025-01-06 | Stop reason: HOSPADM

## 2025-01-05 RX ORDER — FENTANYL CITRATE-0.9 % NACL/PF 10 MCG/ML
100 PLASTIC BAG, INJECTION (ML) INTRAVENOUS EVERY 5 MIN PRN
Status: DISCONTINUED | OUTPATIENT
Start: 2025-01-05 | End: 2025-01-06 | Stop reason: HOSPADM

## 2025-01-05 RX ORDER — SODIUM CHLORIDE, SODIUM LACTATE, POTASSIUM CHLORIDE, CALCIUM CHLORIDE 600; 310; 30; 20 MG/100ML; MG/100ML; MG/100ML; MG/100ML
INJECTION, SOLUTION INTRAVENOUS CONTINUOUS
Status: DISCONTINUED | OUTPATIENT
Start: 2025-01-05 | End: 2025-01-06

## 2025-01-05 RX ORDER — MISOPROSTOL 200 UG/1
400 TABLET ORAL
Status: DISCONTINUED | OUTPATIENT
Start: 2025-01-05 | End: 2025-01-05

## 2025-01-05 RX ORDER — MISOPROSTOL 200 UG/1
800 TABLET ORAL
Status: DISCONTINUED | OUTPATIENT
Start: 2025-01-05 | End: 2025-01-06 | Stop reason: HOSPADM

## 2025-01-05 RX ORDER — OXYTOCIN 10 [USP'U]/ML
10 INJECTION, SOLUTION INTRAMUSCULAR; INTRAVENOUS
Status: DISCONTINUED | OUTPATIENT
Start: 2025-01-05 | End: 2025-01-05

## 2025-01-05 RX ORDER — TRANEXAMIC ACID 10 MG/ML
1 INJECTION, SOLUTION INTRAVENOUS EVERY 30 MIN PRN
Status: DISCONTINUED | OUTPATIENT
Start: 2025-01-05 | End: 2025-01-05

## 2025-01-05 RX ORDER — NALOXONE HYDROCHLORIDE 0.4 MG/ML
0.4 INJECTION, SOLUTION INTRAMUSCULAR; INTRAVENOUS; SUBCUTANEOUS
Status: DISCONTINUED | OUTPATIENT
Start: 2025-01-05 | End: 2025-01-06 | Stop reason: HOSPADM

## 2025-01-05 RX ORDER — TRANEXAMIC ACID 10 MG/ML
1 INJECTION, SOLUTION INTRAVENOUS EVERY 30 MIN PRN
Status: DISCONTINUED | OUTPATIENT
Start: 2025-01-05 | End: 2025-01-06 | Stop reason: HOSPADM

## 2025-01-05 RX ORDER — OXYTOCIN/0.9 % SODIUM CHLORIDE 30/500 ML
100-340 PLASTIC BAG, INJECTION (ML) INTRAVENOUS CONTINUOUS PRN
Status: DISCONTINUED | OUTPATIENT
Start: 2025-01-05 | End: 2025-01-05

## 2025-01-05 RX ORDER — LOPERAMIDE HYDROCHLORIDE 2 MG/1
2 CAPSULE ORAL
Status: DISCONTINUED | OUTPATIENT
Start: 2025-01-05 | End: 2025-01-05

## 2025-01-05 RX ORDER — OXYTOCIN/0.9 % SODIUM CHLORIDE 30/500 ML
340 PLASTIC BAG, INJECTION (ML) INTRAVENOUS CONTINUOUS PRN
Status: DISCONTINUED | OUTPATIENT
Start: 2025-01-05 | End: 2025-01-05

## 2025-01-05 RX ORDER — MISOPROSTOL 200 UG/1
400 TABLET ORAL
Status: DISCONTINUED | OUTPATIENT
Start: 2025-01-05 | End: 2025-01-06 | Stop reason: HOSPADM

## 2025-01-05 RX ORDER — IBUPROFEN 800 MG/1
800 TABLET, FILM COATED ORAL
Status: COMPLETED | OUTPATIENT
Start: 2025-01-05 | End: 2025-01-05

## 2025-01-05 RX ORDER — METOCLOPRAMIDE 10 MG/1
10 TABLET ORAL EVERY 6 HOURS PRN
Status: DISCONTINUED | OUTPATIENT
Start: 2025-01-05 | End: 2025-01-05

## 2025-01-05 RX ORDER — FENTANYL CITRATE 50 UG/ML
50 INJECTION, SOLUTION INTRAMUSCULAR; INTRAVENOUS EVERY 30 MIN PRN
Status: DISCONTINUED | OUTPATIENT
Start: 2025-01-05 | End: 2025-01-06 | Stop reason: HOSPADM

## 2025-01-05 RX ORDER — NALOXONE HYDROCHLORIDE 0.4 MG/ML
0.2 INJECTION, SOLUTION INTRAMUSCULAR; INTRAVENOUS; SUBCUTANEOUS
Status: DISCONTINUED | OUTPATIENT
Start: 2025-01-05 | End: 2025-01-06 | Stop reason: HOSPADM

## 2025-01-05 RX ORDER — METOCLOPRAMIDE HYDROCHLORIDE 5 MG/ML
10 INJECTION INTRAMUSCULAR; INTRAVENOUS EVERY 6 HOURS PRN
Status: DISCONTINUED | OUTPATIENT
Start: 2025-01-05 | End: 2025-01-06 | Stop reason: HOSPADM

## 2025-01-05 RX ORDER — ONDANSETRON 4 MG/1
4 TABLET, ORALLY DISINTEGRATING ORAL EVERY 6 HOURS PRN
Status: DISCONTINUED | OUTPATIENT
Start: 2025-01-05 | End: 2025-01-06 | Stop reason: HOSPADM

## 2025-01-05 RX ORDER — NALBUPHINE HYDROCHLORIDE 10 MG/ML
2.5-5 INJECTION INTRAMUSCULAR; INTRAVENOUS; SUBCUTANEOUS EVERY 6 HOURS PRN
Status: DISCONTINUED | OUTPATIENT
Start: 2025-01-05 | End: 2025-01-07 | Stop reason: HOSPADM

## 2025-01-05 RX ORDER — CITRIC ACID/SODIUM CITRATE 334-500MG
30 SOLUTION, ORAL ORAL
Status: DISCONTINUED | OUTPATIENT
Start: 2025-01-05 | End: 2025-01-05

## 2025-01-05 RX ORDER — LOPERAMIDE HYDROCHLORIDE 2 MG/1
2 CAPSULE ORAL
Status: DISCONTINUED | OUTPATIENT
Start: 2025-01-05 | End: 2025-01-06 | Stop reason: HOSPADM

## 2025-01-05 RX ORDER — FENTANYL/ROPIVACAINE/NS/PF 2MCG/ML-.1
PLASTIC BAG, INJECTION (ML) EPIDURAL
Status: DISCONTINUED | OUTPATIENT
Start: 2025-01-05 | End: 2025-01-06 | Stop reason: HOSPADM

## 2025-01-05 RX ORDER — MISOPROSTOL 200 UG/1
800 TABLET ORAL
Status: DISCONTINUED | OUTPATIENT
Start: 2025-01-05 | End: 2025-01-05

## 2025-01-05 RX ORDER — OXYTOCIN/0.9 % SODIUM CHLORIDE 30/500 ML
100-340 PLASTIC BAG, INJECTION (ML) INTRAVENOUS CONTINUOUS PRN
Status: DISCONTINUED | OUTPATIENT
Start: 2025-01-05 | End: 2025-01-07 | Stop reason: HOSPADM

## 2025-01-05 RX ORDER — PENICILLIN G POTASSIUM 5000000 [IU]/1
5 INJECTION, POWDER, FOR SOLUTION INTRAMUSCULAR; INTRAVENOUS ONCE
Status: COMPLETED | OUTPATIENT
Start: 2025-01-05 | End: 2025-01-05

## 2025-01-05 RX ADMIN — PENICILLIN G POTASSIUM 5 MILLION UNITS: 5000000 POWDER, FOR SOLUTION INTRAMUSCULAR; INTRAPLEURAL; INTRATHECAL; INTRAVENOUS at 06:43

## 2025-01-05 RX ADMIN — SODIUM CHLORIDE, POTASSIUM CHLORIDE, SODIUM LACTATE AND CALCIUM CHLORIDE 1000 ML: 600; 310; 30; 20 INJECTION, SOLUTION INTRAVENOUS at 06:44

## 2025-01-05 RX ADMIN — IBUPROFEN 800 MG: 800 TABLET, FILM COATED ORAL at 20:51

## 2025-01-05 RX ADMIN — Medication 340 ML/HR: at 10:02

## 2025-01-05 RX ADMIN — Medication: at 07:14

## 2025-01-05 ASSESSMENT — ACTIVITIES OF DAILY LIVING (ADL)
ADLS_ACUITY_SCORE: 15

## 2025-01-05 NOTE — PROGRESS NOTES
Pt came in through triage for contractions. Pt denies leaking of fluid and vaginal bleeding. Pt states fetal movement. Pt is ana laura 2-4 minutes. Pt is working towards an epidural at this time. FOB is at bedside supporting pt. VSS.    Kathe Livingston RN

## 2025-01-05 NOTE — TELEPHONE ENCOUNTER
Pt's  called stating that his wife has been having contractions every 10 minutes for the last 2 hours and they are wondering when they should come to the hospital. Pt is a  at 39+5. No leaking of fluid at this time, pt is GBS positive. Discussed plan to come in to be checked or stay home until contractions are closer to every 5 minutes. Patient plans to stay home to labor some more and then will call [facility] when she is planning to come in.  MAHNAZ MARTINEZ RN on 2025 at 5:16 AM

## 2025-01-05 NOTE — L&D DELIVERY NOTE
[facility] Delivery Summary  [facility] Maternity Care  Date of Service: 2025    Name      Topher Kelly         2000  MRN       [id number]  PCP        Radha Horowitz     DELIVERY NARRATIVE    On 2025 Topher Kelly delivered a viable female infant at 39w5d with apgars of 8 and 9 via Vaginal, SpontaneousDelivery.  Prenatal course was notable for nothing.    Stage 1: Topher presented to Maternity Care on 2025 with need for labor management and delivery. Her group B Strep (GBS) carrier status was positive. She received 1 intrapartum doses of IV penicillin. She received nothing for induction/augmentation.  Labor course was notable for nothing.    Stage 2: Duration:  49 minutes.  Delivery was via vaginal, spontaneous to a sterile field under epidural anesthesia. Infant delivered in vertex left occiput transverse position. Shoulders delivered without difficulty. The baby was placed on the patient's abdomen and demonstrated a spontaneous cry and vigorous tone.  No resuscitation was needed.  Cord complications: nuchal. Delayed cord clamping was performed.  The cord was doubly clamped and cut 3 vessels were noted.     Stage 3: Duration: 15 minutes.  Placenta delivered intact at 2025 10:04 AM. Placental disposition was Hospital disposal. Fundal massage performed and fundus found to be firm. The following uterotonics were given: Pitocin (IV). Perineum, vagina, cervix were inspected, and the following lacerations were noted: 2nd degree perineal. Lacerations were repaired in the usual fashion using 3-0 Vicryl. QBL: 50 mL.    Excellent hemostasis was noted. Needle and sponge count correct. Infant and patient in delivery room in good and stable condition.     _________________    GA: 39w5d  GP:   Labor Complications: None  Additional Complications:  None  QBL: 50 mL  Delivery Type: Vaginal, Spontaneous  Duration of Ruptured Membranes: 0h 21m  GBS Status:   Group B  Strep PCR   Date Value Ref Range Status   2024 Positive (A) Negative Final     Comment:     ALERT: Streptococcus agalactiae (Group B Streptococcus) has a high rate of resistance to clindamycin. Therefore, clindamycin is not recommended for treatment unless susceptibility testing has been performed.      [place] Weight: 3.11 kg (6 lb 13.7 oz)  Apgar scores: 8 , 9         Dinh Kelly [[id number]]      Labor Event Times      Active labor onset date: 25 Onset time:  6:15 AM   Dilation complete date: 25 Complete time:  9:07 AM   Start pushing date/time: 2025 0915          Labor Events     labor?: No   steroids: None  Labor Type: Spontaneous  Predominate monitoring during 1st stage: continuous electronic fetal monitoring     Antibiotics received during labor?: Yes  Reason for Antibiotics: GBS       Rupture date/time: 25 0935   Rupture type: Spontaneous Rupture of Membranes  Fluid color: Clear  Fluid odor: Normal     Augmentation: None       Delivery/Placenta Date and Time      Delivery Date: 25 Delivery Time:  9:56 AM   Placenta Date/Time: 2025 10:04 AM  Oxytocin given at the time of delivery: after delivery of baby  Delivering clinician: Radha Horowitz MD   Other personnel present at delivery:  Provider Role   Altagracia Romero, RN Delivery Nurse   Aditya Lanier DO Resident   Laurel Llanos, RN Delivery Nurse             Vaginal Counts       Initial count performed by 2 team members:  Two Team Members   Dr. Carlos Manuel LANGE         Needles Suture Needles Sponges (RETIRED) Instruments   Initial counts 0 1 5    Added to count       Relief counts       Final counts 0 1 5            Placed during labor Accounted for at the end of labor   FSE No NA   IUPC No NA   Cervidil No NA                  Final count performed by 2 team members:  Two Team Members   Jacquelin Ahuja TS      Final count correct?: Yes  Pre-Birth Team Brief: Complete  Post-Birth  "Team Debrief: Complete       Apgars    Living status: Living   1 Minute 5 Minute 10 Minute 15 Minute 20 Minute   Skin color: 0  1       Heart rate: 2  2       Reflex irritability: 2  2       Muscle tone: 2  2       Respiratory effort: 2  2       Total: 8  9       Apgars assigned by: KATELIN LANGE       Cord      Vessels: 3 Vessels    Cord Complications: Nuchal   Nuchal Intervention: delivered through         Nuchal cord description: tight nuchal cord         Cord Blood Disposition: Lab    Gases Sent?: No    Delayed cord clamping?: Yes    Cord Clamping Delay (seconds): >120 seconds    Stem cell collection?: No            Resuscitation    Output in Delivery Room: Stool       [place] Measurements      Weight: 6 lb 13.7 oz Length: 1' 7.25\"     Head circumference: 32 cm    Output in delivery room: Stool       Skin to Skin and Feeding Plan      Skin to skin initiation date/time: 1841    Skin to skin with: Mother  Skin to skin end date/time:            Labor Events and Shoulder Dystocia    Fetal Tracing Prior to Delivery: Category 1  Shoulder dystocia present?: Neg       Delivery (Maternal) (Provider to Complete) ([id number])    Episiotomy: None  Perineal lacerations: 2nd Repaired?: Yes   Repair suture: 3-0 Vicryl  Number of repair packets: 1  Genital tract inspection done: Pos       Blood Loss  Mother: Mili Kelly #[id number]     Start of Mother's Information      Delivery Blood Loss   Intrapartum & Postpartum: 25 0615 - 25 1050    Delivery Admission: 25 0601 - 25 1050         Intrapartum & Postpartum Delivery Admission    Delivery QBL (mL) Hospital Encounter 50 mL 50 mL    Total  50 mL 50 mL               End of Mother's Information  Mother: Mili Kelly #[id number]                Delivery - Provider to Complete ([id number])    Delivering clinician: Radha Horowitz MD  Delivery Type (Choose the 1 that will go to the Birth History): Vaginal, Spontaneous                         Other personnel:  " Provider Role   Altagracia Romero, RN Delivery Nurse   Aditya Lanier DO Resident   Laurel Llanos, RN Delivery Nurse                    Placenta    Date/Time: 1/5/2025 10:04 AM  Removal: Spontaneous  Disposition: Hospital disposal             Anesthesia    Method: Epidural  Cervical dilation at placement: 4-7                    Presentation and Position    Presentation: Vertex    Position: Left Occiput Transverse                     Delivery was supervised by attending physician Dr. Radha Lanier DO PGY-1 1/5/2025  [facility]

## 2025-01-05 NOTE — ANESTHESIA PROCEDURE NOTES
"Epidural catheter Procedure Note    Pre-Procedure   Staff -        Anesthesiologist:  Doni Serrano MD       Performed By: anesthesiologist       Location: OB       Procedure Start/Stop Times: 1/5/2025 7:04 AM and 1/5/2025 7:20 AM       Pre-Anesthestic Checklist: patient identified, IV checked, risks and benefits discussed, informed consent, monitors and equipment checked, pre-op evaluation, at physician/surgeon's request and post-op pain management  Timeout:       Correct Patient: Yes        Correct Procedure: Yes        Correct Site: Yes        Correct Position: Yes   Procedure Documentation  Procedure: epidural catheter         Patient Position: sitting       Skin prep: Chloraprep       Local skin infiltrated with 1.5 mL of 1% lidocaine.        Insertion Site: L2-3. (midline approach).       Technique: LORT saline        CIARAN at 4 cm.       Needle Type: Crowdparky needle       Needle Gauge: 18.        Needle Length (Inches): 3.5        Catheter: 20 G.          Catheter threaded easily.             # of attempts: 1 and  # of redirects:  0    Assessment/Narrative         Paresthesias: No.       Test dose of 3 mL lidocaine 1.5% w/ 1:200,000 epinephrine at.         Test dose negative, 3 minutes after injection, for signs of intravascular, subdural, or intrathecal injection.       Insertion/Infusion Method: LORT saline       Aspiration negative for Heme or CSF via Epidural Catheter.    Medication(s) Administered   0.125% Bupivacaine + 2 mcg/mL Fentanyl via CADD (Epidural) - EPIDURAL   10 mL - 1/5/2025 7:20:00 AM  Medication Administration Time: 1/5/2025 7:04 AM      FOR [facility] ([facility]) ONLY:   Pain Team Contact information: please page the Pain Team Via [employer]. Search \"Pain\". During daytime hours, please page the attending first. At night please page the resident first.      "

## 2025-01-05 NOTE — H&P
OBSTETRICS ADMISSION HISTORY & PHYSICAL  DATE OF ADMISSION: 2025  6:01 AM        Assessment and Plan:   Assessment:  Topher Kelly is a 24 year old  at 39w5d in active labor. Prenatal course has been uncomplicated. GBS+.     PLAN:   Admit - see IP orders  GBS: positive. Antibiotics are indicated   Comfort plan: Epidural   Will monitor labor progress along with RN and update attending physician    Patient discussed with attending physician, Dr. Horowitz , who agrees with the plan.     Estephania Nye MD PGY-1,  2025  [facility] Family [facility] Res[facility]         Chief Complaint:     Contractions       History of Present Illness:     Topher Kelly is a 24 year old year old  at 39w5d confirmed by 13w US. Patient received prenatal care with Radha Horowitz at [facility].    Presents to the [facility] for active labor management.    She reports regular contractions starting at 2am, and occurring every 2-3 minutes. She denies fluid leakage. She denies bleeding per vagina. Fetal movement is normal.    Patient denies PIH symptoms including HA, vision changes, chest pain, shortness of breath, RUQ pain, new/worsening swelling in extremities. Denies dysuria.     Her prenatal course has been uncomplicated.    Prenatal labs   Lab Results   Component Value Date    AS Negative 2024    HEPBANG Nonreactive 2024    HGB 11.6 (L) 10/21/2024       GBS was collected on 24.  Weight gain during pregnancy: 11.8 kg (26 lb)         Obstetrical History:     OB History    Para Term  AB Living   2 1 1 0 0 1   SAB IAB Ectopic Multiple Live Births   0 0 0 0 1      # Outcome Date GA Lbr Karthikeyan/2nd Weight Sex Type Anes PTL Lv   2 Current            1 Term 22 41w0d 15:57 / 00:33 3.118 kg (6 lb 14 oz) F Vag-Spont  N YAEL      Birth Comments: epidrual, spont labor, breast feeding 8 months, long bith 17 hours Region      Name: Stewart      Apgar1: 9  Apgar5: 9               Immunzations:     Most Recent Immunizations   Administered Date(s) Administered    COVID-19 MONOVALENT 12+ (Pfizer) 04/29/2021    Hep B, Adult (Heplisav- B) 09/14/2022    Hepatitis B, Adult 10/24/2022    Influenza Vaccine >6 months,quad, PF 10/24/2022    Influenza Vaccine Trivalent (FluBlok) 10/21/2024    RSV Vaccine (Abrysvo) 11/25/2024    TDAP (Adacel,Boostrix) 10/21/2024    Varicella 09/17/2019     Tdap this pregnancy?  YES - Date: 10/21/24  Flu shot this pregnancy?YES - Date: 10/21/14  COVID vaccine? NO  RSV vaccine? YES - Date: 11/25/24         Past Medical History:   No past medical history on file.         Past Surgical History:   No past surgical history on file.         Family History:   No family history on file.         Social History:   no tobacco use  no alcohol use  no illicit drug use         Medications:     Current Facility-Administered Medications   Medication Dose Route Frequency Provider Last Rate Last Admin    carboprost (HEMABATE) injection 250 mcg  250 mcg Intramuscular Q15 Min PRN Radha Horowitz MD        And    loperamide (IMODIUM) capsule 4 mg  4 mg Oral Once PRN Radha Horowitz MD        fentaNYL (PF) (SUBLIMAZE) injection 50 mcg  50 mcg Intravenous Q30 Min PRN Radha Horowitz MD        ketorolac (TORADOL) injection 30 mg  30 mg Intravenous Once PRN Radha Horowitz MD        Or    ketorolac (TORADOL) injection 30 mg  30 mg Intramuscular Once PRN Radha Horowitz MD        Or    ibuprofen (ADVIL/MOTRIN) tablet 800 mg  800 mg Oral Once PRN Radha Horowitz MD        lactated ringers BOLUS 500 mL  500 mL Intravenous Once PRN Radha Horowitz MD        lactated ringers infusion   Intravenous Continuous Radha Horowitz MD        lidocaine 1 % 0.1-20 mL  0.1-20 mL Subcutaneous Once PRN Radha Horowitz MD        loperamide (IMODIUM) capsule 2 mg  2 mg Oral Q2H PRN Radha Horowitz MD         methylergonovine (METHERGINE) injection 200 mcg  200 mcg Intramuscular Q2H PRN Radha Horowitz MD        metoclopramide (REGLAN) tablet 10 mg  10 mg Oral Q6H PRN Radha Horowitz MD        Or    metoclopramide (REGLAN) injection 10 mg  10 mg Intravenous Q6H PRN Radha Horowitz MD        misoprostol (CYTOTEC) tablet 400 mcg  400 mcg Oral ONCE PRN REPEAT PER INSTRUCTIONS Radha Horowitz MD        Or    misoprostol (CYTOTEC) tablet 800 mcg  800 mcg Rectal ONCE PRN REPEAT PER INSTRUCTIONS Radha Horowitz MD        naloxone (NARCAN) injection 0.2 mg  0.2 mg Intravenous Q2 Min PRN Radha Horowitz MD        Or    naloxone (NARCAN) injection 0.4 mg  0.4 mg Intravenous Q2 Min PRN Radha Horowitz MD        Or    naloxone (NARCAN) injection 0.2 mg  0.2 mg Intramuscular Q2 Min PRN Radha Horowitz MD        Or    naloxone (NARCAN) injection 0.4 mg  0.4 mg Intramuscular Q2 Min PRN Radha Horowitz MD        nitrous oxide/oxygen 50/50 blend   Inhalation Continuous PRN Radha Horowitz MD        ondansetron (ZOFRAN ODT) ODT tab 4 mg  4 mg Oral Q6H PRN Radha Horowitz MD        Or    ondansetron (ZOFRAN) injection 4 mg  4 mg Intravenous Q6H PRN Radha Horowitz MD        oxytocin (PITOCIN) 30 units in 500 mL 0.9% NaCl infusion  100-340 mL/hr Intravenous Continuous PRN Radha Horowitz MD        oxytocin (PITOCIN) 30 units in 500 mL 0.9% NaCl infusion  340 mL/hr Intravenous Continuous PRN Radha Horowitz MD        oxytocin (PITOCIN) injection 10 Units  10 Units Intramuscular Once PRN Radha Horowitz MD        oxytocin (PITOCIN) injection 10 Units  10 Units Intramuscular Once PRN Radha Horowitz MD        penicillin G potassium 5 million units vial to attach to  mL bag  5 Million Units Intravenous Once Radha Horowitz MD   5 Million Units at 01/05/25 0639     "Followed by    penicillin G potassium 3 Million Units in D5W 50 mL intermittent infusion  3 Million Units Intravenous Q4H Radha Horowitz MD        prochlorperazine (COMPAZINE) tablet 10 mg  10 mg Oral Q6H PRN Radha Horowitz MD        Or    prochlorperazine (COMPAZINE) injection 10 mg  10 mg Intravenous Q6H PRN Radha Horowitz MD        sodium citrate-citric acid (BICITRA) solution 30 mL  30 mL Oral Once PRN Radha Horowitz MD        tranexamic acid 1 g in 100 mL NS IV bag (premix)  1 g Intravenous Q30 Min PRN Radha Horowitz MD                Allergies:   Patient has no known allergies.         Review of Systems:   Negative except as noted in HPI         Physical Exam:   Vitals:   LMP 03/24/2024   0 lbs 0 oz  Estimated body mass index is 27.42 kg/m  as calculated from the following:    Height as of 1/2/25: 1.5 m (4' 11.06\").    Weight as of 1/2/25: 61.7 kg (136 lb).    GEN: Awake, alert, very uncomfortable with contractions  HEENT: grossly normal  RESPIRATORY: normal work of breathing, deep breathing with contractions  CARDIOVASCULAR: RRR, no murmur  ABDOMEN: gravid  PELVIC:  no fluid noted, no blood noted.   Cervix: 6/80-90%/-1  EXT:  no edema or calf tenderness  Confirmed VTX by Ultrasound? Yes on 1/2/25    Electronic Fetal Monitoring:  Baseline rate normal  Variability moderate  Accelerations not present  Decelerations not present    Assessment: Category I EFM interpretation suggests absence of concern for fetal metabolic acidemia at this time due to decelerations absent, heart rate: normal baseline, and variability: moderate    Uterine Activity normal.    Strip reviewed remotely via [employer]      "

## 2025-01-05 NOTE — ANESTHESIA POSTPROCEDURE EVALUATION
Patient: Topher Kelly    Procedure: * No procedures listed *       Anesthesia Type:  Epidural    Note:  Disposition: Inpatient   Postop Pain Control: Uneventful            Sign Out: Well controlled pain   PONV: No   Neuro/Psych: Uneventful            Sign Out: Acceptable/Baseline neuro status   Airway/Respiratory: Uneventful            Sign Out: Acceptable/Baseline resp. status   CV/Hemodynamics: Uneventful            Sign Out: Acceptable CV status; No obvious hypovolemia; No obvious fluid overload   Other NRE: NONE   DID A NON-ROUTINE EVENT OCCUR?            Last vitals:  Vitals:    01/05/25 1135 01/05/25 1200 01/05/25 1215   BP: 105/61 104/74 112/82   Pulse:      Resp:      Temp:      SpO2:          Electronically Signed By: Doni Serrano MD  January 5, 2025  3:49 PM

## 2025-01-05 NOTE — PATIENT INSTRUCTIONS
"Assessment of Breastfeeding after discharge: Is baby getting enough to eat?    If you answer  YES  to all these questions by day 5, you will know breastfeeding is going well.    If you answer  NO  to any of these questions, call your baby's medical provider or the lactation clinic.   Refer to \"Postpartum and  Care\" (PNC) , starting on page 35. (This is the booklet you tracked baby's feedings and diaper counts while in the hospital.)   Please call one of our Outpatient Lactation Consultants at [phone] at any time with breastfeeding questions or concerns.    1.  My milk came in (breasts became frazier on day 3-5 after birth).  I am softening the areola using hand expression or reverse pressure softening prior to latch, as needed.  YES NO   2.  My baby breastfeeds at least 8 times in 24 hours. YES NO   3.  My baby usually gives feeding cues (answer  No  if your baby is sleepy and you need to wake baby for most feedings).  *PNC page 36   YES NO   4.  My baby latches on my breast easily.  *PNC page 37  YES NO   5.  During breastfeeding, I hear my baby frequently swallowing, (one-two sucks per swallow).  YES NO   6.  I allow my baby to drain the first breast before I offer the other side.   YES NO   7.  My baby is satisfied after breastfeeding.   *PNC page 39 YES NO   8.  My breasts feel frazier before feedings and softer after feedings. YES NO   9.  My breasts and nipples are comfortable.  I have no engorgement or cracked nipples.    *PNC Page 40 and 41  YES NO   10.  My baby is meeting the wet diaper goals each day.  *PNC page 38  YES NO   11.  My baby is meeting the soiled diaper goals each day. *PNC page 38 YES NO   12.  My baby is only getting my breast milk, no formula. YES NO   13. I know my baby needs to be back to birth weight by day 14.  YES NO   14. I know my baby will cluster feed and have growth spurts. *PNC page 39  YES NO   15.  I feel confident in breastfeeding.  If not, I know where to get " "support. YES NO      [employer] has a short video (2:47) called:   \"Alliance Hold/Asymmetric Latch\" Breastfeeding Education by LUIS.        Other websites:  [email or web address]-Breastfeeding Videos  [email or web address]--Our videos-Breastfeeding  [email or web address]    "

## 2025-01-05 NOTE — PROGRESS NOTES
Pt up independently. Declined tylenol or ibuprofen. Fundus firm at the umbilicus. Attempting to breast feed. + oral intake

## 2025-01-05 NOTE — ANESTHESIA PREPROCEDURE EVALUATION
"Anesthesia Pre-Procedure Evaluation    Patient: Topher Kelly   MRN: [id number] : 2000        Procedure :           History reviewed. No pertinent past medical history.   History reviewed. No pertinent surgical history.   No Known Allergies   Social History     Tobacco Use    Smoking status: Never     Passive exposure: Never    Smokeless tobacco: Never   Substance Use Topics    Alcohol use: Never      Wt Readings from Last 1 Encounters:   25 61.7 kg (136 lb)        Anesthesia Evaluation            ROS/MED HX  ENT/Pulmonary:  - neg pulmonary ROS     Neurologic:  - neg neurologic ROS     Cardiovascular:  - neg cardiovascular ROS     METS/Exercise Tolerance:     Hematologic:  - neg hematologic  ROS     Musculoskeletal:       GI/Hepatic:  - neg GI/hepatic ROS     Renal/Genitourinary:       Endo:  - neg endo ROS     Psychiatric/Substance Use:  - neg psychiatric ROS     Infectious Disease:       Malignancy:       Other:   Pregnant         Physical Exam    Airway        Mallampati: II   TM distance: > 3 FB   Neck ROM: full   Mouth opening: > 3 cm    Respiratory Devices and Support         Dental  no notable dental history         Cardiovascular   cardiovascular exam normal          Pulmonary   pulmonary exam normal                OUTSIDE LABS:  CBC:   Lab Results   Component Value Date    WBC 8.9 10/21/2024    WBC 9.4 2024    HGB 14.6 2025    HGB 11.6 (L) 10/21/2024    HCT 35.2 10/21/2024    HCT 37.1 2024     10/21/2024     2024     BMP: No results found for: \"NA\", \"POTASSIUM\", \"CHLORIDE\", \"CO2\", \"BUN\", \"CR\", \"GLC\"  COAGS: No results found for: \"PTT\", \"INR\", \"FIBR\"  POC: No results found for: \"BGM\", \"HCG\", \"HCGS\"  HEPATIC: No results found for: \"ALBUMIN\", \"PROTTOTAL\", \"ALT\", \"AST\", \"GGT\", \"ALKPHOS\", \"BILITOTAL\", \"BILIDIRECT\", \"FREDY\"  OTHER:   Lab Results   Component Value Date    A1C 5.1 2024    TSH 0.99 2024       Anesthesia Plan    ASA Status:  2     "   Anesthesia Type: Epidural.              Consents    Anesthesia Plan(s) and associated risks, benefits, and realistic alternatives discussed. Questions answered and patient/representative(s) expressed understanding.     - Discussed:     - Discussed with:  Patient            Postoperative Care            Comments:           neg OB ROS.      Doni Serrano MD    I have reviewed the pertinent notes and labs in the chart from the past 30 days and (re)examined the patient.  Any updates or changes from those notes are reflected in this note.

## 2025-01-06 PROCEDURE — 250N000013 HC RX MED GY IP 250 OP 250 PS 637: Performed by: FAMILY MEDICINE

## 2025-01-06 PROCEDURE — 99207 PR SUBSEQUENT HOSPITAL CARE FOR MOTHER, 15 MINUTES: CPT | Performed by: FAMILY MEDICINE

## 2025-01-06 PROCEDURE — 120N000001 HC R&B MED SURG/OB

## 2025-01-06 RX ORDER — BISACODYL 10 MG
10 SUPPOSITORY, RECTAL RECTAL DAILY PRN
Status: DISCONTINUED | OUTPATIENT
Start: 2025-01-06 | End: 2025-01-07 | Stop reason: HOSPADM

## 2025-01-06 RX ORDER — MISOPROSTOL 200 UG/1
800 TABLET ORAL
Status: DISCONTINUED | OUTPATIENT
Start: 2025-01-06 | End: 2025-01-07 | Stop reason: HOSPADM

## 2025-01-06 RX ORDER — MISOPROSTOL 200 UG/1
400 TABLET ORAL
Status: DISCONTINUED | OUTPATIENT
Start: 2025-01-06 | End: 2025-01-07 | Stop reason: HOSPADM

## 2025-01-06 RX ORDER — CARBOPROST TROMETHAMINE 250 UG/ML
250 INJECTION, SOLUTION INTRAMUSCULAR
Status: DISCONTINUED | OUTPATIENT
Start: 2025-01-06 | End: 2025-01-07 | Stop reason: HOSPADM

## 2025-01-06 RX ORDER — METHYLERGONOVINE MALEATE 0.2 MG/ML
200 INJECTION INTRAVENOUS
Status: DISCONTINUED | OUTPATIENT
Start: 2025-01-06 | End: 2025-01-07 | Stop reason: HOSPADM

## 2025-01-06 RX ORDER — MODIFIED LANOLIN
OINTMENT (GRAM) TOPICAL
Status: DISCONTINUED | OUTPATIENT
Start: 2025-01-06 | End: 2025-01-07 | Stop reason: HOSPADM

## 2025-01-06 RX ORDER — LOPERAMIDE HYDROCHLORIDE 2 MG/1
2 CAPSULE ORAL
Status: DISCONTINUED | OUTPATIENT
Start: 2025-01-06 | End: 2025-01-07 | Stop reason: HOSPADM

## 2025-01-06 RX ORDER — LOPERAMIDE HYDROCHLORIDE 2 MG/1
4 CAPSULE ORAL
Status: DISCONTINUED | OUTPATIENT
Start: 2025-01-06 | End: 2025-01-07 | Stop reason: HOSPADM

## 2025-01-06 RX ORDER — OXYTOCIN 10 [USP'U]/ML
10 INJECTION, SOLUTION INTRAMUSCULAR; INTRAVENOUS
Status: DISCONTINUED | OUTPATIENT
Start: 2025-01-06 | End: 2025-01-07 | Stop reason: HOSPADM

## 2025-01-06 RX ORDER — IBUPROFEN 800 MG/1
800 TABLET, FILM COATED ORAL EVERY 6 HOURS PRN
Status: DISCONTINUED | OUTPATIENT
Start: 2025-01-06 | End: 2025-01-07 | Stop reason: HOSPADM

## 2025-01-06 RX ORDER — OXYTOCIN/0.9 % SODIUM CHLORIDE 30/500 ML
340 PLASTIC BAG, INJECTION (ML) INTRAVENOUS CONTINUOUS PRN
Status: DISCONTINUED | OUTPATIENT
Start: 2025-01-06 | End: 2025-01-07 | Stop reason: HOSPADM

## 2025-01-06 RX ORDER — HYDROCORTISONE 25 MG/G
CREAM TOPICAL 3 TIMES DAILY PRN
Status: DISCONTINUED | OUTPATIENT
Start: 2025-01-06 | End: 2025-01-07 | Stop reason: HOSPADM

## 2025-01-06 RX ORDER — ACETAMINOPHEN 325 MG/1
650 TABLET ORAL EVERY 4 HOURS PRN
Status: DISCONTINUED | OUTPATIENT
Start: 2025-01-06 | End: 2025-01-07 | Stop reason: HOSPADM

## 2025-01-06 RX ORDER — TRANEXAMIC ACID 10 MG/ML
1 INJECTION, SOLUTION INTRAVENOUS EVERY 30 MIN PRN
Status: DISCONTINUED | OUTPATIENT
Start: 2025-01-06 | End: 2025-01-07 | Stop reason: HOSPADM

## 2025-01-06 RX ORDER — DOCUSATE SODIUM 100 MG/1
100 CAPSULE, LIQUID FILLED ORAL DAILY
Status: DISCONTINUED | OUTPATIENT
Start: 2025-01-06 | End: 2025-01-07 | Stop reason: HOSPADM

## 2025-01-06 RX ADMIN — IBUPROFEN 800 MG: 800 TABLET, FILM COATED ORAL at 09:56

## 2025-01-06 RX ADMIN — DOCUSATE SODIUM 100 MG: 100 CAPSULE, LIQUID FILLED ORAL at 09:56

## 2025-01-06 RX ADMIN — IBUPROFEN 800 MG: 800 TABLET, FILM COATED ORAL at 16:39

## 2025-01-06 RX ADMIN — IBUPROFEN 800 MG: 800 TABLET, FILM COATED ORAL at 23:00

## 2025-01-06 ASSESSMENT — ACTIVITIES OF DAILY LIVING (ADL)
ADLS_ACUITY_SCORE: 15

## 2025-01-06 NOTE — PROGRESS NOTES
[facility]    Post-Partum Progress Note    Assessment & Plan   Assessment:  Post-partum day #1  Normal spontaneous vaginal delivery  2nd degree perineal laceration, repaired    Doing well.  Normal healing wound.  No excessive bleeding  Pain well-controlled.    Plan:  Ambulation encouraged  Breast feeding strategies discussed  Pain control measures as needed  Anticipate discharge tomorrow    Jazmyn Diaz MD     Interval History   Doing well.  Pain is well-controlled.  No fevers.  No history of foul-smelling vaginal discharge.  Good appetite.  Denies chest pain, shortness of breath, nausea or vomiting.  Vaginal bleeding is similar to a heavy menstrual flow.  Ambulatory.  Breastfeeding attempted, primarily formula-feeding in the hospital.  Plans to breastfeed at home she states.    Medications   Current Facility-Administered Medications   Medication Dose Route Frequency Provider Last Rate Last Admin    No MMR Needed - Assessment: Patient does not need MMR vaccine   Does not apply Continuous PRN Jazmyn Diaz MD        No Tdap Needed - Assessment: Patient does not need Tdap vaccine   Does not apply Continuous PRN Jazmyn Diaz MD        oxytocin (PITOCIN) 30 units in 500 mL 0.9% NaCl infusion  340 mL/hr Intravenous Continuous PRN Jazmyn Diaz MD        oxytocin (PITOCIN) 30 units in 500 mL 0.9% NaCl infusion  100-340 mL/hr Intravenous Continuous PRN Radha Horowitz  mL/hr at 01/05/25 1002 340 mL/hr at 01/05/25 1002     Current Facility-Administered Medications   Medication Dose Route Frequency Provider Last Rate Last Admin    docusate sodium (COLACE) capsule 100 mg  100 mg Oral Daily Jazmyn Diaz MD   100 mg at 01/06/25 0956       Physical Exam   Temp: 97.7  F (36.5  C) Temp src: Oral BP: 100/57 Pulse: 73   Resp: 16 SpO2: 99 % O2 Device: None (Room air)    Vitals:    01/05/25 0648   Weight: 61.7 kg (136 lb)     Vital Signs with Ranges  Temp:   [97.7  F (36.5  C)-98.2  F (36.8  C)] 97.7  F (36.5  C)  Pulse:  [73-79] 73  Resp:  [16-20] 16  BP: ()/(57-82) 100/57  SpO2:  [97 %-99 %] 99 %  I/O last 3 completed shifts:  In: 500 [P.O.:500]  Out: 1259 [Urine:1100; Blood:159]    Uterine fundus is firm, non-tender and at the level of the umbilicus  Extremities Non-tender  Perineal sutures intact and wound healing well    Data   Recent Labs   Lab Test 01/05/25  0638   AS Negative     Recent Labs   Lab Test 01/05/25  0638 10/21/24  1535   HGB 14.6 11.6*     Recent Labs   Lab Test 07/23/24  1039   RUQIGG Positive

## 2025-01-06 NOTE — PLAN OF CARE
Problem: Adult Inpatient Plan of Care  Goal: Plan of Care Review  Description: The Plan of Care Review/Shift note should be completed every shift.  The Outcome Evaluation is a brief statement about your assessment that the patient is improving, declining, or no change.  This information will be displayed automatically on your shift  note.  1/6/2025 0505 by Avis Porras RN  Outcome: Progressing  1/6/2025 0505 by Avis Porras RN  Outcome: Progressing     Problem: Postpartum (Vaginal Delivery)  Goal: Effective Urinary Elimination  1/6/2025 0505 by Avis Porras RN  Outcome: Progressing  1/6/2025 0505 by Avis Porras RN  Outcome: Progressing     Problem: Postpartum (Vaginal Delivery)  Goal: Optimal Pain Control and Function  1/6/2025 0505 by Avis Porras RN  Outcome: Progressing  1/6/2025 0505 by Avis Porras RN  Outcome: Progressing   Goal Outcome Evaluation:  Pt pain minimal overnight. BP soft overnight all other vitals stable. Minimal bleeding and firm and no clots. Voiding.

## 2025-01-06 NOTE — PLAN OF CARE
Problem: Postpartum (Vaginal Delivery)  Goal: Hemostasis  1/6/2025 1734 by Amy South RN  Outcome: Progressing    Problem: Postpartum (Vaginal Delivery)  Goal: Absence of Infection Signs and Symptoms  1/6/2025 1734 by Amy South RN  Outcome: Progressing    Problem: Postpartum (Vaginal Delivery)  Goal: Optimal Pain Control and Function  1/6/2025 1734 by Amy South RN  Outcome: Progressing  Intervention: Prevent or Manage Pain  Recent Flowsheet Documentation  Taken 1/6/2025 1639 by Amy South RN  Pain Management Interventions: medication (see MAR)      VSS. Pain was managed with PRN Ibuprofen this shift. Bleeding is WNL. Fundus firm and midline. Pt is bonding well with baby and attentive to cares. Up and independent in the room. IV removed this AM, per pt request. Pt is hopeful to discharge tomorrow morning.

## 2025-01-07 VITALS
SYSTOLIC BLOOD PRESSURE: 105 MMHG | DIASTOLIC BLOOD PRESSURE: 73 MMHG | HEART RATE: 66 BPM | RESPIRATION RATE: 14 BRPM | HEIGHT: 59 IN | BODY MASS INDEX: 27.42 KG/M2 | TEMPERATURE: 98.3 F | WEIGHT: 136 LBS | OXYGEN SATURATION: 97 %

## 2025-01-07 PROCEDURE — 250N000013 HC RX MED GY IP 250 OP 250 PS 637: Performed by: FAMILY MEDICINE

## 2025-01-07 PROCEDURE — 99207 PR SUBSEQUENT HOSPITAL CARE FOR MOTHER, 15 MINUTES: CPT | Performed by: FAMILY MEDICINE

## 2025-01-07 RX ORDER — DOCUSATE SODIUM 100 MG/1
100 CAPSULE, LIQUID FILLED ORAL 2 TIMES DAILY PRN
Qty: 60 CAPSULE | Refills: 0 | Status: SHIPPED | OUTPATIENT
Start: 2025-01-07

## 2025-01-07 RX ORDER — IBUPROFEN 600 MG/1
600 TABLET, FILM COATED ORAL EVERY 8 HOURS PRN
Qty: 30 TABLET | Refills: 0 | Status: SHIPPED | OUTPATIENT
Start: 2025-01-07

## 2025-01-07 RX ADMIN — DOCUSATE SODIUM 100 MG: 100 CAPSULE, LIQUID FILLED ORAL at 09:21

## 2025-01-07 RX ADMIN — IBUPROFEN 800 MG: 800 TABLET, FILM COATED ORAL at 05:50

## 2025-01-07 ASSESSMENT — ACTIVITIES OF DAILY LIVING (ADL)
ADLS_ACUITY_SCORE: 15

## 2025-01-07 NOTE — PLAN OF CARE
Problem: Adult Inpatient Plan of Care  Goal: Plan of Care Review  Description: The Plan of Care Review/Shift note should be completed every shift.  The Outcome Evaluation is a brief statement about your assessment that the patient is improving, declining, or no change.  This information will be displayed automatically on your shift  note.  1/7/2025 0531 by Avis Porras RN  Outcome: Progressing  1/7/2025 0531 by Avis Porras RN  Outcome: Progressing     Problem: Postpartum (Vaginal Delivery)  Goal: Hemostasis  1/7/2025 0531 by Avis Porras RN  Outcome: Progressing  1/7/2025 0531 by Avis Porras RN  Outcome: Progressing     Problem: Postpartum (Vaginal Delivery)  Goal: Effective Urinary Elimination  1/7/2025 0531 by Avis Porras RN  Outcome: Progressing  1/7/2025 0531 by Avis Porras RN  Outcome: Progressing     Problem: Postpartum (Vaginal Delivery)  Goal: Optimal Pain Control and Function  1/7/2025 0531 by Avis Porras RN  Outcome: Progressing  1/7/2025 0531 by Avis Porras RN  Outcome: Progressing   Goal Outcome Evaluation:  Pt vitals stable overnight. Minimal pain controlled with ibuprofen overnight. Scant bleeding and firm.

## 2025-01-07 NOTE — DISCHARGE INSTRUCTIONS
Warning Signs after Having a Baby    Keep this paper on your fridge or somewhere else where you can see it.    Call your provider if you have any of these symptoms up to 12 weeks after having your baby.    Thoughts of hurting yourself or your baby  Pain in your chest or trouble breathing  Severe headache not helped by pain medicine  Eyesight concerns (blurry vision, seeing spots or flashes of light, other changes to eyesight)  Fainting, shaking or other signs of a seizure    Call 9-1-1 if you feel that it is an emergency.     The symptoms below can happen to anyone after giving birth. They can be very serious. Call your provider if you have any of these warning signs.    My provider s phone number: _______________________    Losing too much blood (hemorrhage)    Call your provider if you soak through a pad in less than an hour or pass blood clots bigger than a golf ball. These may be signs that you are bleeding too much.    Blood clots in the legs or lungs    After you give birth, your body naturally clots its blood to help prevent blood loss. Sometimes this increased clotting can happen in other areas of the body, like the legs or lungs. This can block your blood flow and be very dangerous.     Call your provider if you:  Have a red, swollen spot on the back of your leg that is warm or painful when you touch it.   Are coughing up blood.     Infection    Call your provider if you have any of these symptoms:  Fever of 100.4 F (38 C) or higher.  Pain or redness around your stitches if you had an incision.   Any yellow, white, or green fluid coming from places where you had stitches or surgery.    Mood Problems (postpartum depression)    Many people feel sad or have mood changes after having a baby. But for some people, these mood swings are worse.     Call your provider right away if you feel so anxious or nervous that you can't care for yourself or your baby.    Preeclampsia (high blood pressure)    Even if you  didn't have high blood pressure when you were pregnant, you are at risk for the high blood pressure disease called preeclampsia. This risk can last up to 12 weeks after giving birth.     Call your provider if you have:   Pain on your right side under your rib cage  Sudden swelling in the hands and face    Remember: You know your body. If something doesn't feel right, get medical help.     For informational purposes only. Not to replace the advice of your health care provider. Copyright 2020 [facility]. All rights reserved. Clinically reviewed by Nkechi Cote, RNC-OB, MSN. [employer] [id number] - Rev 02/23.

## 2025-01-07 NOTE — PROGRESS NOTES
"Outreach Note for EPIC    Chart reviewed, discharge plan discussed with patient, needs assessed. Patient verbalizes understanding of plan, [facility], [facility] nurse visit planned for 25, Home Care Intake updated. Patient and , \"Yudith\", phone number is reported as correct in EMR.    Patient states she has good support at home, has baby care essentials, and feels ready to discharge today. Outreach RN will continue to follow and assist if needed with discharge plan. No additional needs identified at this time.      "

## 2025-01-07 NOTE — DISCHARGE SUMMARY
[facility]    Discharge Summary  Obstetrics    Date of Admission:  2025  Date of Discharge:  2025  Discharging Provider: Jazmyn Diaz MD    Discharge Diagnoses     2nd degree perineal laceration, repaired    History of Present Illness   Topher Kelly is a 24 year old female who presented with active labor.  Had a normal progression and vaginal delivery without postpartum complications.    Hospital Course   The patient's hospital course was unremarkable.  She recovered as anticipated and experienced no post-delivery complications.  On discharge, her pain was well controlled. Vaginal bleeding is similar to peak menstrual flow.  Voiding without difficulty.  Ambulating well and tolerating a normal diet.  No fevers.  Breastfeeding attempted, plans to bottle feed inpatient, more breastfeeding at home.  Infant is stable.  She was discharged on post-partum day #2.    Post-partum hemoglobin:   Hemoglobin   Date Value Ref Range Status   2025 14.6 11.7 - 15.7 g/dL Final       [place] Depression Scale  Thoughts of Harming Self: Never  Total Score: 0      Jazmyn Diaz MD    Discharge Disposition   Discharged to home   Condition at discharge: Good    Discharge Exam:  Gen: NAD  Abd: uterus firm below umbilicus  Ext: WWP, no edema  : no asymmetric swelling  Psych: normal mood and affect    Primary Care Physician   Radha Horowitz    Consultations This Hospital Stay   LACTATION IP CONSULT    Discharge Orders      Activity    Activity as tolerated     Reason for your hospital stay    Birth and postpartum care     Follow Up (6 weeks)    Follow up with provider in 6 weeks for post-delivery check     Breast pump - Manual/Electric    Breast Pump Documentation:  Manual/Electric Pump: To support adequate breast milk production and nutrition for infant.     I, the undersigned, certify that the above prescribed supplies are medically necessary for this patient and is both reasonable and  necessary in reference to accepted standards of medical and necessary in reference to accepted standards of medical practice in the treatment of this patient's condition and is not prescribed as a convenience.     Diet    Resume previous diet     Discharge Medications   Current Discharge Medication List        START taking these medications    Details   docusate sodium (COLACE) 100 MG capsule Take 1 capsule (100 mg) by mouth 2 times daily as needed for constipation.  Qty: 60 capsule, Refills: 0    Associated Diagnoses:  (normal spontaneous vaginal delivery); Second degree perineal laceration during delivery, delivered      ibuprofen (ADVIL/MOTRIN) 600 MG tablet Take 1 tablet (600 mg) by mouth every 8 hours as needed for other (cramping).  Qty: 30 tablet, Refills: 0    Associated Diagnoses:  (normal spontaneous vaginal delivery); Second degree perineal laceration during delivery, delivered           CONTINUE these medications which have NOT CHANGED    Details   fluticasone (FLONASE) 50 MCG/ACT nasal spray INSTILL 1 SPRAY INTO BOTH NOSTRILS DAILY  Qty: 16 g, Refills: 0    Associated Diagnoses: Chronic rhinitis      Omega-3 Fatty Acids (FISH OIL PO) Take by mouth.      Prenatal Vit-Fe Fumarate-FA (PRENATAL PO)       vitamin D3 (CHOLECALCIFEROL) 50 mcg (2000 units) tablet Take 1 tablet (50 mcg) by mouth daily  Qty: 90 tablet, Refills: 1    Associated Diagnoses: Encounter for supervision of other normal pregnancy in first trimester           Allergies   No Known Allergies

## 2025-01-20 ENCOUNTER — MEDICAL CORRESPONDENCE (OUTPATIENT)
Dept: HEALTH INFORMATION MANAGEMENT | Facility: CLINIC | Age: 25
End: 2025-01-20

## 2025-02-20 ENCOUNTER — PRENATAL OFFICE VISIT (OUTPATIENT)
Dept: FAMILY MEDICINE | Facility: CLINIC | Age: 25
End: 2025-02-20
Payer: COMMERCIAL

## 2025-02-20 VITALS
SYSTOLIC BLOOD PRESSURE: 97 MMHG | HEART RATE: 80 BPM | OXYGEN SATURATION: 96 % | TEMPERATURE: 97.7 F | WEIGHT: 114 LBS | BODY MASS INDEX: 22.98 KG/M2 | HEIGHT: 59 IN | RESPIRATION RATE: 12 BRPM | DIASTOLIC BLOOD PRESSURE: 63 MMHG

## 2025-02-20 NOTE — PROGRESS NOTES
Assessment/plan   Topher Kelly is a 24 year old female who is established to my practice, here for a 6 week post partum visit.      Routine postpartum follow up  - Mood is good, declines need for additional resources. Mindfulness and exercise reviewed.  - Pap is not indicated  - Hb  normal on 2025 (delivery day); will defer recheck at this time  - Continue prenatal vitamin  - Continue increasing iron containing foods in diet  - Contraception:  declines; I did review risk for pregnancy brittny now that she has had return of her first mense    There are no diagnoses linked to this encounter.    Follow up: for contraception management, mood check or annual exam as needed    Radha Horowitz MD    Subjective:      HPI: Topher Kelly is a 24 year old female who is here for post partum visit.    chief complaint    Post partum check: I have fully reviewed the prenatal and intrapartum course.  39w5d at time of delivery.    Outcome: spontaneous vaginal delivery  Delivery course & outcome: Delivery on 2025 with Carlos Manuel/. Ctx started 0200; arrived to L&D @ 6cm approx 6am, delivery by 10am approx. Pushed for 50min; , 2nd degree tear.     Postpartum course has been uncomplicated.  Baby's course has been uncomplicated.  She is currently doing a combination of nursing & pumping      Patient has not resumed intercourse.   Contraception methods discussed per plan above.    Bleeding pattern: normal for postpartum course; normal for postpartum course, no significant clots or cramping    She thinks she had her first period last week. Having some brown and dark red discharge.      Bowel function: normal, colace reviewed as needed  Bladder function: normal    Postpartum depression screening score: 3   [place]  Depression Scale     Other medical conditions that need follow up: none    Review of Systems:    12 point comprehensive review of systems was negative except as noted and HPI     Social History:  Social  "History     Social History Narrative    Not on file       Medical History:  The following portions of the patient's history were reviewed and updated as appropriate: allergies, current medications, and problem list    Medications:  Current Outpatient Medications   Medication Sig Dispense Refill    docusate sodium (COLACE) 100 MG capsule Take 1 capsule (100 mg) by mouth 2 times daily as needed for constipation. 60 capsule 0    ibuprofen (ADVIL/MOTRIN) 600 MG tablet Take 1 tablet (600 mg) by mouth every 8 hours as needed for other (cramping). 30 tablet 0    Prenatal Vit-Fe Fumarate-FA (PRENATAL PO)       vitamin D3 (CHOLECALCIFEROL) 50 mcg (2000 units) tablet Take 1 tablet (50 mcg) by mouth daily 90 tablet 1    fluticasone (FLONASE) 50 MCG/ACT nasal spray INSTILL 1 SPRAY INTO BOTH NOSTRILS DAILY (Patient not taking: Reported on 2/20/2025) 16 g 0    Omega-3 Fatty Acids (FISH OIL PO) Take by mouth. (Patient not taking: Reported on 2/20/2025)         Imaging & Labs reviewed this visit:   Lab Results   Component Value Date    WBC 8.9 10/21/2024    HGB 14.6 01/05/2025    HCT 35.2 10/21/2024    MCV 98 10/21/2024     10/21/2024     Lab Results   Component Value Date    TSH 0.99 07/23/2024     No results found for: \"HGBA1C\"    Objective:      Vitals:    02/20/25 0942   BP: 97/63   Pulse: 80   Resp: 12   Temp: 97.7  F (36.5  C)   TempSrc: Temporal   SpO2: 96%   Weight: 51.7 kg (114 lb)   Height: 1.486 m (4' 10.5\")       Physical Exam:     General: Alert, no acute distress.   HEET: normocephalic conjunctivae are clear, Nose is clear.  Oropharynx is moist and clear, without tonsillar hypertrophy, asymmetry, exudate or lesions.   Neck: supple without adenopathy or thyromegaly.  Back: Symmetric, no curvature, ROM normal, no CVA tenderness  Breasts: No breast masses, tenderness, asymmetry, or nipple discharge.  Lungs: Good aeration bilaterally. Clear to auscultation without wheezes, rales or rhonci.   Heart: regular rate and " rhythm, normal S1 and S2, no murmurs  Abdomen: soft and nontender, no masses, no organomegaly  Skin: clear without rash or lesions  Neuro: alert, interactive moving all extremities equally, normal muscle tone in all 4 extremities, deep tendon reflexes 2+ symmetrically at the patella  Pelvic: Normal vulva. Vagina and cervix healing; no lesions, inflammation, discharge or tenderness. No cystocele, No rectocele. Uterus contracted.  No adnexal mass or tenderness.   Extremities: Extremities normal, atraumatic, no cyanosis or edema  Lymph nodes: Cervical &  supraclavicular nodes normal

## 2025-02-20 NOTE — PATIENT INSTRUCTIONS
"Healthy lactating breasts sometimes feel lumpy, and there can be pain, fullness or redness after prolonged periods without removing milk.     Sometimes we can get an area of inflammation in the breast, with a lump, pain and sometimes redness.  This is often called a \"plugged duct,\" even though it is not actually a physical clogging of one milk duct.  It is comes from the milk ducts being narrowed by inflammation.      The main things to do are use some gentle heat on that area if it is comfortable for you, take ibuprofen or Tylenol if you need it for pain, and try some very gentle massage. The massage should be extremely light, like petting a cat, and go from the center of your breast outwards towards your armpit.  This helps the swelling and extra fluid move out of your breast.  You can also add a lecithin supplement, 1200 mg four times daily, to help with milk flow and reduce inflammation.  Continue to nurse your baby as you have been--you do not need to nurse more on that side, because increasing the milk supply will actually just worsen the problem.  If you are pumping, just pump the amount that your baby needs.       Occasionally if the inflammation increases, women can feel tired, achy or have a fever--this is called inflammatory mastitis, and it generally goes away within 24 hours as you do the things mentioned above.  If it doesn't get better, though, and you are feeling ill or have a fever for more than 24 hours, then you may have a bacterial infection that needs medication--so you should call your doctor or provider about a possible prescription.         WHAT TO DO: Many mastitis symptoms will resolve with conservative care and social support     If breast is so swollen and inflamed that milk is not being released, stop attempting to further feed or express from that side.  Feed from the other side until inflammation decreases.  Milk supply may decrease, but that is partially the goal, and if it drops too " far, it can be increased again later.     Application of ice     Ibuprofen 800 mg q8h and/or Tylenol 1000 mg q8h to reduce inflammation     Heat to breast if desired--can increase comfort although no evidence for effect on outcomes     Lecithin supplement, 5000 - 10,000 mg daily to emulsify milk and decrease ductal inflammation (this would be 1200 mg - 2400 mg qid)     Treat oversupply:  it predisposes to congestion and ductal inflammation, which leads to more ductal narrowing and inflammation.  May temporarily increase discomfort, fullness and redness, but ultimately leads to fewer episodes of inflammation     Consider use of breast-specific probiotics (evidence mixed):  should contain L. fermentum or L. salivarius     Minimize pumping other than that needed to provide for infant s needs

## 2025-03-19 ENCOUNTER — OFFICE VISIT (OUTPATIENT)
Dept: FAMILY MEDICINE | Facility: CLINIC | Age: 25
End: 2025-03-19
Payer: COMMERCIAL

## 2025-03-19 VITALS
RESPIRATION RATE: 14 BRPM | WEIGHT: 114 LBS | HEART RATE: 81 BPM | TEMPERATURE: 98.1 F | BODY MASS INDEX: 23.42 KG/M2 | DIASTOLIC BLOOD PRESSURE: 70 MMHG | OXYGEN SATURATION: 98 % | SYSTOLIC BLOOD PRESSURE: 110 MMHG

## 2025-03-19 DIAGNOSIS — L50.9 HIVES: Primary | ICD-10-CM

## 2025-03-19 PROCEDURE — 99213 OFFICE O/P EST LOW 20 MIN: CPT | Performed by: NURSE PRACTITIONER

## 2025-03-19 RX ORDER — TRIAMCINOLONE ACETONIDE 0.25 MG/G
OINTMENT TOPICAL 2 TIMES DAILY
Qty: 80 G | Refills: 0 | Status: SHIPPED | OUTPATIENT
Start: 2025-03-19

## 2025-03-19 RX ORDER — PREDNISONE 20 MG/1
40 TABLET ORAL DAILY
Qty: 10 TABLET | Refills: 0 | Status: SHIPPED | OUTPATIENT
Start: 2025-03-19 | End: 2025-03-24

## 2025-03-19 RX ORDER — CETIRIZINE HYDROCHLORIDE 10 MG/1
20 TABLET ORAL 2 TIMES DAILY
Refills: 0 | COMMUNITY
Start: 2025-03-19 | End: 2025-03-26

## 2025-03-19 NOTE — PROGRESS NOTES
Assessment & Plan     Hives  Discussed this does appear to be hives-not able to identify a trigger. AT this point we could be looking at post viral hives however if they do persist I would be concerned for chronic spontaneous urticaria.    Recommend patient follow up with allergy to discuss further and obtain possible allergy.    Recommend zyrtec up to 20 mg twice a day. Patient is breast feeding however has been dumping currently. Discussed that normal doses of zyrtec would be ok for breast feeding however would recommend not doing high dose with breast feeding.    Prednisone at 40 mg for 5 days.     Triamcinolone cream to itchy areas.   - cetirizine (ZYRTEC) 10 MG tablet; Take 2 tablets (20 mg) by mouth 2 times daily for 7 days.  - Adult Allergy/Asthma  Referral; Future  - triamcinolone (KENALOG) 0.025 % external ointment; Apply topically 2 times daily.  - predniSONE (DELTASONE) 20 MG tablet; Take 2 tablets (40 mg) by mouth daily for 5 days.                Connor Obando is a 24 year old, presenting for the following health issues:  Allergic Reaction (2 weeks ago had a cold, took dayquil and started having rash ; no longer having cold )        3/19/2025    10:42 AM   Additional Questions   Roomed by Gopi X     History of Present Illness       Reason for visit:  Allergy  Symptom onset:  1-2 weeks ago  Symptoms include:  Rash  Symptom intensity:  Moderate  Symptom progression:  Staying the same  Had these symptoms before:  No  What makes it better:  Benadrl   She is taking medications regularly.      Patient started having rash on body-has been taking benadryl and zyrtec for the last two weeks. Will last for a couple of days but then                 Objective    /70 (BP Location: Right arm, Patient Position: Sitting, Cuff Size: Adult Regular)   Pulse 81   Temp 98.1  F (36.7  C) (Temporal)   Resp 14   Wt 51.7 kg (114 lb)   LMP 02/13/2025 (Exact Date)   SpO2 98%   Breastfeeding Yes   BMI  23.42 kg/m    Body mass index is 23.42 kg/m .  Physical Exam  Constitutional:       Appearance: Normal appearance.   Skin:     Findings: Rash (hives) present.   Neurological:      General: No focal deficit present.      Mental Status: She is alert and oriented to person, place, and time.   Psychiatric:         Mood and Affect: Mood normal.         Behavior: Behavior normal.            No results found for any visits on 03/19/25.        Signed Electronically by: STEPHEN YOUNG CNP

## 2025-04-03 ENCOUNTER — OFFICE VISIT (OUTPATIENT)
Dept: ALLERGY | Facility: CLINIC | Age: 25
End: 2025-04-03
Attending: NURSE PRACTITIONER
Payer: COMMERCIAL

## 2025-04-03 VITALS — WEIGHT: 106 LBS | HEART RATE: 71 BPM | OXYGEN SATURATION: 98 % | BODY MASS INDEX: 21.78 KG/M2

## 2025-04-03 DIAGNOSIS — J30.89 ALLERGIC RHINITIS DUE TO DUST MITE: ICD-10-CM

## 2025-04-03 DIAGNOSIS — J31.0 NONALLERGIC RHINITIS: Primary | ICD-10-CM

## 2025-04-03 DIAGNOSIS — L50.9 HIVES: ICD-10-CM

## 2025-04-03 DIAGNOSIS — Z88.9 DRUG ALLERGY: ICD-10-CM

## 2025-04-03 RX ORDER — AZELASTINE 1 MG/ML
2 SPRAY, METERED NASAL 2 TIMES DAILY
Qty: 30 ML | Refills: 3 | Status: SHIPPED | OUTPATIENT
Start: 2025-04-03

## 2025-04-03 NOTE — PATIENT INSTRUCTIONS
Avoid dextromethorphan and phenylephrine    Dust mite control    Wash bedding weekly, covers, keep humidity <50%    Astelin 2 sprays each nostril twice daily as needed

## 2025-04-03 NOTE — PROGRESS NOTES
Connor Obando is a 24 year old, presenting for the following health issues:  Allergy Consult    HPI    Chief complaint: Rash, runny nose    History of present illness: This is a pleasant 24-year-old woman I was asked to see for evaluation of allergies by Allyssa Reagan.  Patient states recently had a reaction to DayQuil.  Was feeling under the weather and took a dose of DayQuil.  About 1/2-hour later developed hives.  Patient has taken Tylenol since that time without symptoms but has avoided dextromethorphan and phenylephrine.  Has taken DayQuil previously without any symptoms.  Did not take any other over-the-counter medications has never had reactions before.  Patient states she is going to avoid DayQuil but she does have concerns about possibly allergies.  She does note a runny nose and sneezing.  Symptoms occurred when she moved to [place] in 2019.  She uses Zyrtec as needed and use this more for the rash.  She was given Flonase which did not seem to help.  She has no history of asthma.  No cough, wheeze or shortness of breath.    Past medical history: Recently delivered a baby    Social history: She is a student, lives in an older home with carpeting, no pets, [place] area with central air and a basement, secondhand smoke exposure, she is a non-smoker    Family history: Negative for allergies and asthma            Objective    Pulse 71   Wt 48.1 kg (106 lb)   LMP 02/13/2025 (Exact Date)   SpO2 98%   BMI 21.78 kg/m    Body mass index is 21.78 kg/m .  Physical Exam   Gen: Pleasant female not in acute distress  HEENT: Eyes no erythema of the bulbar or palpebral conjunctiva, no edema. Nose: No congestion, mucosa normal. Mouth: Throat clear, no lip or tongue edema.   Respiratory: Clear to auscultation bilaterally, no adventitious breath sounds  Skin: No rashes or lesions  Psych: Alert and oriented times 3      At today's visit the patient/parent and I engaged in an informed consent  discussion about allergy testing.  We discussed skin testing, blood testing,  and the alternative of not undergoing any testing. The patient/ parent has a preference for skin testing. We then discussed the risks and benefits of skin testing.  The patient/ parent understands skin testing risks can include, but are not limited to, urticaria, angioedema, shortness of breath, and severe anaphylaxis.  The benefits include, but are not limited, to evaluation for allergens causing symptoms.  After answering the patients/parents questions they have agreed to proceed with skin testing.    30 percutaneous test were placed in environmental skin test panel.  Positive histamine control for 4 mm response to DP dust mite.  Please see scanned photograph.    Impression report and plan:  1.  Allergic rhinitis to dust mite  2.  Nonallergic rhinitis  3.  Drug allergy    Still the patient should avoid dextromethorphan and phenylephrine.  Could consider an office challenge but no skin testing exists for this reaction.  Really does not control the response to testing was normal.  I do not think this is the entire picture for her rhinitis.  I think she has nonallergic rhinitis as well.  Recommended Astelin nasal spray as needed.            Signed Electronically by: Irma Sam MD

## 2025-04-03 NOTE — LETTER
4/3/2025      Topher Kelly  [address] [place] [address]      Dear Colleague,    Thank you for referring your patient, Topher Kelly, to the [facility]. Please see a copy of my visit note below.          Subjective  Topher is a 24 year old, presenting for the following health issues:  Allergy Consult    HPI    Chief complaint: Rash, runny nose    History of present illness: This is a pleasant 24-year-old woman I was asked to see for evaluation of allergies by Allyssa Reagan.  Patient states recently had a reaction to DayQuil.  Was feeling under the weather and took a dose of DayQuil.  About 1/2-hour later developed hives.  Patient has taken Tylenol since that time without symptoms but has avoided dextromethorphan and phenylephrine.  Has taken DayQuil previously without any symptoms.  Did not take any other over-the-counter medications has never had reactions before.  Patient states she is going to avoid DayQuil but she does have concerns about possibly allergies.  She does note a runny nose and sneezing.  Symptoms occurred when she moved to [place] in 2019.  She uses Zyrtec as needed and use this more for the rash.  She was given Flonase which did not seem to help.  She has no history of asthma.  No cough, wheeze or shortness of breath.    Past medical history: Recently delivered a baby    Social history: She is a student, lives in an older home with carpeting, no pets, lives in a [place] area with central air and a basement, secondhand smoke exposure, she is a non-smoker    Family history: Negative for allergies and asthma            Objective   Pulse 71   Wt 48.1 kg (106 lb)   LMP 02/13/2025 (Exact Date)   SpO2 98%   BMI 21.78 kg/m    Body mass index is 21.78 kg/m .  Physical Exam   Gen: Pleasant female not in acute distress  HEENT: Eyes no erythema of the bulbar or palpebral conjunctiva, no edema. Nose: No congestion, mucosa normal. Mouth: Throat clear, no lip or tongue  edema.   Respiratory: Clear to auscultation bilaterally, no adventitious breath sounds  Skin: No rashes or lesions  Psych: Alert and oriented times 3      At today's visit the patient/parent and I engaged in an informed consent discussion about allergy testing.  We discussed skin testing, blood testing,  and the alternative of not undergoing any testing. The patient/ parent has a preference for skin testing. We then discussed the risks and benefits of skin testing.  The patient/ parent understands skin testing risks can include, but are not limited to, urticaria, angioedema, shortness of breath, and severe anaphylaxis.  The benefits include, but are not limited, to evaluation for allergens causing symptoms.  After answering the patients/parents questions they have agreed to proceed with skin testing.    30 percutaneous test were placed in environmental skin test panel.  Positive histamine control for 4 mm response to DP dust mite.  Please see scanned photograph.    Impression report and plan:  1.  Allergic rhinitis to dust mite  2.  Nonallergic rhinitis  3.  Drug allergy    Still the patient should avoid dextromethorphan and phenylephrine.  Could consider an office challenge but no skin testing exists for this reaction.  Really does not control the response to testing was normal.  I do not think this is the entire picture for her rhinitis.  I think she has nonallergic rhinitis as well.  Recommended Astelin nasal spray as needed.            Signed Electronically by: Irma Sam MD      Again, thank you for allowing me to participate in the care of your patient.        Sincerely,        Irma Sma MD    Electronically signed

## 2025-05-06 ENCOUNTER — MEDICAL CORRESPONDENCE (OUTPATIENT)
Dept: HEALTH INFORMATION MANAGEMENT | Facility: CLINIC | Age: 25
End: 2025-05-06
Payer: COMMERCIAL

## 2025-06-26 ENCOUNTER — TRANSCRIBE ORDERS (OUTPATIENT)
Dept: OTHER | Age: 25
End: 2025-06-26

## 2025-06-26 DIAGNOSIS — J30.9 CHRONIC ALLERGIC RHINITIS: Primary | ICD-10-CM

## 2025-07-01 ENCOUNTER — PATIENT OUTREACH (OUTPATIENT)
Dept: CARE COORDINATION | Facility: CLINIC | Age: 25
End: 2025-07-01
Payer: COMMERCIAL

## 2025-07-03 ENCOUNTER — PATIENT OUTREACH (OUTPATIENT)
Dept: CARE COORDINATION | Facility: CLINIC | Age: 25
End: 2025-07-03
Payer: COMMERCIAL

## 2025-08-10 ENCOUNTER — HEALTH MAINTENANCE LETTER (OUTPATIENT)
Age: 25
End: 2025-08-10